# Patient Record
Sex: FEMALE | Race: BLACK OR AFRICAN AMERICAN | Employment: FULL TIME | URBAN - METROPOLITAN AREA
[De-identification: names, ages, dates, MRNs, and addresses within clinical notes are randomized per-mention and may not be internally consistent; named-entity substitution may affect disease eponyms.]

---

## 2018-08-02 ENCOUNTER — HOSPITAL ENCOUNTER (EMERGENCY)
Age: 48
Discharge: HOME | End: 2018-08-02
Attending: EMERGENCY MEDICINE
Payer: COMMERCIAL

## 2018-08-02 VITALS
BODY MASS INDEX: 41.23 KG/M2 | HEART RATE: 84 BPM | OXYGEN SATURATION: 99 % | SYSTOLIC BLOOD PRESSURE: 134 MMHG | DIASTOLIC BLOOD PRESSURE: 85 MMHG | RESPIRATION RATE: 17 BRPM | HEIGHT: 60 IN | TEMPERATURE: 98.6 F | WEIGHT: 210 LBS

## 2018-08-02 DIAGNOSIS — T78.40XA ALLERGIC REACTION, INITIAL ENCOUNTER: Primary | ICD-10-CM

## 2018-08-02 DIAGNOSIS — H10.33 ACUTE CONJUNCTIVITIS OF BOTH EYES, UNSPECIFIED ACUTE CONJUNCTIVITIS TYPE: ICD-10-CM

## 2018-08-02 DIAGNOSIS — R22.0 SWELLING OF BOTH LIPS: ICD-10-CM

## 2018-08-02 PROCEDURE — 99283 EMERGENCY DEPT VISIT LOW MDM: CPT

## 2018-08-02 PROCEDURE — 74011636637 HC RX REV CODE- 636/637: Performed by: PHYSICIAN ASSISTANT

## 2018-08-02 RX ORDER — TRIAMCINOLONE ACETONIDE 0.25 MG/G
OINTMENT TOPICAL 2 TIMES DAILY
Qty: 30 G | Refills: 0 | Status: SHIPPED | OUTPATIENT
Start: 2018-08-02 | End: 2019-09-09

## 2018-08-02 RX ORDER — PREDNISONE 50 MG/1
50 TABLET ORAL DAILY
Qty: 3 TAB | Refills: 0 | Status: SHIPPED | OUTPATIENT
Start: 2018-08-02 | End: 2018-08-05

## 2018-08-02 RX ORDER — PREDNISONE 20 MG/1
60 TABLET ORAL
Status: COMPLETED | OUTPATIENT
Start: 2018-08-02 | End: 2018-08-02

## 2018-08-02 RX ORDER — ERYTHROMYCIN 5 MG/G
OINTMENT OPHTHALMIC
Qty: 1 TUBE | Refills: 0 | Status: SHIPPED | OUTPATIENT
Start: 2018-08-02 | End: 2018-08-09

## 2018-08-02 RX ADMIN — PREDNISONE 60 MG: 20 TABLET ORAL at 10:28

## 2018-08-02 NOTE — DISCHARGE INSTRUCTIONS
Allergic Reaction: Care Instructions  Your Care Instructions    An allergic reaction is an excessive response from your immune system to a medicine, chemical, food, insect bite, or other substance. A reaction can range from mild to life-threatening. Some people have a mild rash, hives, and itching or stomach cramps. In severe reactions, swelling of your tongue and throat can close up your airway so that you cannot breathe. Follow-up care is a key part of your treatment and safety. Be sure to make and go to all appointments, and call your doctor if you are having problems. It's also a good idea to know your test results and keep a list of the medicines you take. How can you care for yourself at home? · If you know what caused your allergic reaction, be sure to avoid it. Your allergy may become more severe each time you have a reaction. · Take an over-the-counter antihistamine, such as cetirizine (Zyrtec) or loratadine (Claritin), to treat mild symptoms. Read and follow directions on the label. Some antihistamines can make you feel sleepy. Do not give antihistamines to a child unless you have checked with your doctor first. Mild symptoms include sneezing or an itchy or runny nose; an itchy mouth; a few hives or mild itching; and mild nausea or stomach discomfort. · Do not scratch hives or a rash. Put a cold, moist towel on them or take cool baths to relieve itching. Put ice packs on hives, swelling, or insect stings for 10 to 15 minutes at a time. Put a thin cloth between the ice pack and your skin. Do not take hot baths or showers. They will make the itching worse. · Your doctor may prescribe a shot of epinephrine to carry with you in case you have a severe reaction. Learn how to give yourself the shot and keep it with you at all times. Make sure it is not . · Go to the emergency room every time you have a severe reaction, even if you have used your shot of epinephrine and are feeling better. Symptoms can come back after a shot. · Wear medical alert jewelry that lists your allergies. You can buy this at most drugstores. · If your child has a severe allergy, make sure that his or her teachers, babysitters, coaches, and other caregivers know about the allergy. They should have an epinephrine shot, know how and when to give it, and have a plan to take your child to the hospital.  When should you call for help? Give an epinephrine shot if:    · You think you are having a severe allergic reaction.     · You have symptoms in more than one body area, such as mild nausea and an itchy mouth.    After giving an epinephrine shot call 911, even if you feel better.   Call 911 if:    · You have symptoms of a severe allergic reaction. These may include:  ¨ Sudden raised, red areas (hives) all over your body. ¨ Swelling of the throat, mouth, lips, or tongue. ¨ Trouble breathing. ¨ Passing out (losing consciousness). Or you may feel very lightheaded or suddenly feel weak, confused, or restless.     · You have been given an epinephrine shot, even if you feel better.    Call your doctor now or seek immediate medical care if:    · You have symptoms of an allergic reaction, such as:  ¨ A rash or hives (raised, red areas on the skin). ¨ Itching. ¨ Swelling. ¨ Belly pain, nausea, or vomiting.    Watch closely for changes in your health, and be sure to contact your doctor if:    · You do not get better as expected. Where can you learn more? Go to http://manjeet-kai.info/. Enter J613 in the       [address]: Care Instructions  Your Care Instructions    Pinkeye is redness and swelling of the eye surface and the conjunctiva (the lining of the eyelid and the covering of the white part of the eye). Pinkeye is also called conjunctivitis. Pinkeye is often caused by infection with bacteria or a virus. Dry air, allergies, smoke, and chemicals are other common causes.   Pinkeye often clears on its own in 7 to 10 days. Antibiotics only help if the pinkeye is caused by bacteria. Pinkeye caused by infection spreads easily. If an allergy or chemical is causing pinkeye, it will not go away unless you can avoid whatever is causing it. Follow-up care is a key part of your treatment and safety. Be sure to make and go to all appointments, and call your doctor if you are having problems. It's also a good idea to know your test results and keep a list of the medicines you take. How can you care for yourself at home? · Wash your hands often. Always wash them before and after you treat pinkeye or touch your eyes or face. · Use moist cotton or a clean, wet cloth to remove crust. Wipe from the inside corner of the eye to the outside. Use a clean part of the cloth for each wipe. · Put cold or warm wet cloths on your eye a few times a day if the eye hurts. · Do not wear contact lenses or eye makeup until the pinkeye is gone. Throw away any eye makeup you were using when you got pinkeye. Clean your contacts and storage case. If you wear disposable contacts, use a new pair when your eye has cleared and it is safe to wear contacts again. · If the doctor gave you antibiotic ointment or eyedrops, use them as directed. Use the medicine for as long as instructed, even if your eye starts looking better soon. Keep the bottle tip clean, and do not let it touch the eye area. · To put in eyedrops or ointment:  ¨ Tilt your head back, and pull your lower eyelid down with one finger. ¨ Drop or squirt the medicine inside the lower lid. ¨ Close your eye for 30 to 60 seconds to let the drops or ointment move around. ¨ Do not touch the ointment or dropper tip to your eyelashes or any other surface. · Do not share towels, pillows, or washcloths while you have pinkeye. When should you call for help?   Call your doctor now or seek immediate medical care if:    · You have pain in your eye, not just irritation on the surface.     · You have a change in vision or loss of vision.     · You have an increase in discharge from the eye.     · Your eye has not started to improve or begins to get worse within 48 hours after you start using antibiotics.     · Pinkeye lasts longer than 7 days.    Watch closely for changes in your health, and be sure to contact your doctor if you have any problems. Where can you learn more? Go to http://manjeet-kai.info/. Enter Y392 in the search box to learn more about \"Pinkeye: Care Instructions. \"  Current as of: November 20, 2017  Content Version: 11.7  © 0590-7620 [employer]. Care instructions adapted under license by [employer] (which disclaims liability or warranty for this information). If you have questions about a medical condition or this instruction, always ask your healthcare professional. [address] any warranty or liability for your use of this information. search box to learn more about \"Allergic Reaction: Care Instructions. \"  Current as of: October 6, 2017  Content Version: 11.7  © 8846-7083 [employer]. Care instructions adapted under license by [employer] (which disclaims liability or warranty for this information). If you have questions about a medical condition or this instruction, always ask your healthcare professional. [address] any warranty or liability for your use of this information.

## 2018-08-02 NOTE — ED TRIAGE NOTES
4 days of bilateral eye itching w/ redness and yellow green discharge. Lips dry. Visual acuity 20/20

## 2018-08-02 NOTE — ED PROVIDER NOTES
EMERGENCY DEPARTMENT HISTORY AND PHYSICAL EXAM 
 
Date: 8/2/2018 Patient Name: Stephane Man History of Presenting Illness Chief Complaint Patient presents with  
[address] [place] [address] History Provided By: Patient Chief Complaint: eye discharge/itch, swollen lips Duration: 4 Days Timing:  Constant Location: bilateral eyes Quality: itching Severity: 4 out of 10 Modifying Factors: possible shellfish allergy Associated Symptoms: denies any other associated signs or symptoms Additional History (Context): April Michael Small is a 46 y.o. female with hx of eczema who presents with complaint of swollen lips and itchy red eyes. PT states sx started 4 days ago after eating some crab legs. SHe states she had similar sx last year while in [facility] where she had to receive epinephrine. Eyes have yellow/green discharge each morning. She denies throat closing or tongue to big for mouth feeling. PCP: Yohana Gray DO Past History Past Medical History: 
Past Medical History:  
Diagnosis Date  Contact dermatitis and other eczema, due to unspecified cause Eczema Past Surgical History: 
Past Surgical History:  
Procedure Laterality Date [address] Family History: 
Family History Problem Relation Age of Onset  Hypertension Mother  Diabetes Mother Social History: 
Social History Substance Use Topics  Smoking status: Never Smoker  Smokeless tobacco: Never Used  Alcohol use Yes Comment: occassionally Allergies: 
No Known Allergies Review of Systems Review of Systems Constitutional: Negative for fatigue and fever. HENT: Positive for facial swelling. Negative for congestion. Eyes: Positive for discharge. Negative for pain. Respiratory: Negative for cough. Cardiovascular: Negative for chest pain. Gastrointestinal: Negative for abdominal pain.   
Genitourinary: Negative for dyspareunia and dysuria. Musculoskeletal: Negative for back pain. Skin: Negative for wound. Neurological: Negative for dizziness and headaches. All other systems reviewed and are negative. All Other Systems Negative Physical Exam  
 
Vitals:  
 08/02/18 8130 BP: 134/85 Pulse: 84 Resp: 17 Temp: 98.6 °F (37 °C) SpO2: 99% Weight: 95.3 kg (210 lb) Height: 5' (1.524 m) Physical Exam  
Constitutional: She appears well-developed and well-nourished. No distress. HENT:  
Head: Normocephalic and atraumatic. Right Ear: Hearing and external ear normal.  
Left Ear: Hearing and external ear normal.  
Nose: Nose normal.  
Eyes: EOM are normal. Pupils are equal, round, and reactive to light. Right eye exhibits discharge. Left eye exhibits discharge. Right conjunctiva is injected. Left conjunctiva is injected. Neck: Normal range of motion. Cardiovascular: Normal rate. Pulmonary/Chest: No respiratory distress. Diagnostic Study Results Labs - No results found for this or any previous visit (from the past 12 hour(s)). Radiologic Studies - No orders to display CT Results  (Last 48 hours) None CXR Results  (Last 48 hours) None Medical Decision Making I am the first provider for this patient. I reviewed the vital signs, available nursing notes, past medical history, past surgical history, family history and social history. Vital Signs-Reviewed the patient's vital signs. Pulse Oximetry Analysis - 99% on RA Records Reviewed: Old Medical Records Procedures: 
Procedures Provider Notes (Medical Decision Making):  
 
possible allergic reaction to shell fish, will treat with steroids and OTC benadryl, but will also treat for possible bacterial conjunctivitis MED RECONCILIATION: 
No current facility-administered medications for this encounter. Current Outpatient Prescriptions Medication Sig  
 triamcinolone acetonide (KENALOG) 0.025 % ointment Apply  to affected area two (2) times a day. use thin layer  erythromycin (ILOTYCIN) ophthalmic ointment 1 cm ribbon each eye 4x daily for 5 days  predniSONE (DELTASONE) 50 mg tablet Take 1 Tab by mouth daily for 3 days. Disposition: 
discharge DISCHARGE NOTE:  
 
Pt has been reexamined. Patient has no new complaints, changes, or physical findings. Care plan outlined and precautions discussed. Results of visit were reviewed with the patient. All medications were reviewed with the patient; will d/c home with steroids and antibiotic drops. All of pt's questions and concerns were addressed. Patient was instructed and agrees to follow up with PCP, as well as to return to the ED upon further deterioration. Patient is ready to go home. Follow-up Information Follow up With Details Comments Contact Info Og Boss, DO  As needed, follow up [id number] [phone] [phone] [facility] EMERGENCY DEPT  If symptoms worsen [address] [facility] 
[phone] Current Discharge Medication List  
  
START taking these medications Details  
triamcinolone acetonide (KENALOG) 0.025 % ointment Apply  to affected area two (2) times a day. use thin layer Qty: 30 g, Refills: 0  
  
erythromycin (ILOTYCIN) ophthalmic ointment 1 cm ribbon each eye 4x daily for 5 days Qty: 1 Tube, Refills: 0  
  
predniSONE (DELTASONE) 50 mg tablet Take 1 Tab by mouth daily for 3 days. Qty: 3 Tab, Refills: 0 Diagnosis Clinical Impression: 1. Allergic reaction, initial encounter 2. Acute conjunctivitis of both eyes, unspecified acute conjunctivitis type 3. Swelling of both lips

## 2019-07-22 ENCOUNTER — HOSPITAL ENCOUNTER (EMERGENCY)
Age: 49
Discharge: HOME | End: 2019-07-22
Attending: EMERGENCY MEDICINE
Payer: COMMERCIAL

## 2019-07-22 VITALS
RESPIRATION RATE: 16 BRPM | WEIGHT: 200 LBS | SYSTOLIC BLOOD PRESSURE: 153 MMHG | HEIGHT: 60 IN | TEMPERATURE: 98 F | DIASTOLIC BLOOD PRESSURE: 94 MMHG | BODY MASS INDEX: 39.27 KG/M2 | HEART RATE: 85 BPM | OXYGEN SATURATION: 100 %

## 2019-07-22 DIAGNOSIS — D72.819 LEUKOPENIA, UNSPECIFIED TYPE: ICD-10-CM

## 2019-07-22 DIAGNOSIS — N93.8 DUB (DYSFUNCTIONAL UTERINE BLEEDING): Primary | ICD-10-CM

## 2019-07-22 LAB
ANION GAP SERPL CALC-SCNC: 4 MMOL/L (ref 3–18)
APPEARANCE UR: CLEAR
BACTERIA URNS QL MICRO: ABNORMAL /HPF
BASOPHILS # BLD: 0 K/UL (ref 0–0.1)
BASOPHILS NFR BLD: 0 % (ref 0–2)
BILIRUB UR QL: NEGATIVE
BUN SERPL-MCNC: 8 MG/DL (ref 7–18)
BUN/CREAT SERPL: 9 (ref 12–20)
CALCIUM SERPL-MCNC: 8.4 MG/DL (ref 8.5–10.1)
CHLORIDE SERPL-SCNC: 109 MMOL/L (ref 100–111)
CO2 SERPL-SCNC: 26 MMOL/L (ref 21–32)
COLOR UR: YELLOW
CREAT SERPL-MCNC: 0.89 MG/DL (ref 0.6–1.3)
DIFFERENTIAL METHOD BLD: ABNORMAL
EOSINOPHIL # BLD: 0.1 K/UL (ref 0–0.4)
EOSINOPHIL NFR BLD: 3 % (ref 0–5)
EPITH CASTS URNS QL MICRO: ABNORMAL /LPF (ref 0–5)
ERYTHROCYTE [DISTWIDTH] IN BLOOD BY AUTOMATED COUNT: 12.9 % (ref 11.6–14.5)
GLUCOSE SERPL-MCNC: 82 MG/DL (ref 74–99)
GLUCOSE UR STRIP.AUTO-MCNC: NEGATIVE MG/DL
HCT VFR BLD AUTO: 30.1 % (ref 35–45)
HGB BLD-MCNC: 9.9 G/DL (ref 12–16)
HGB UR QL STRIP: ABNORMAL
KETONES UR QL STRIP.AUTO: NEGATIVE MG/DL
LEUKOCYTE ESTERASE UR QL STRIP.AUTO: ABNORMAL
LYMPHOCYTES # BLD: 0.8 K/UL (ref 0.9–3.6)
LYMPHOCYTES NFR BLD: 32 % (ref 21–52)
MCH RBC QN AUTO: 27.7 PG (ref 24–34)
MCHC RBC AUTO-ENTMCNC: 32.9 G/DL (ref 31–37)
MCV RBC AUTO: 84.3 FL (ref 74–97)
MONOCYTES # BLD: 0.4 K/UL (ref 0.05–1.2)
MONOCYTES NFR BLD: 15 % (ref 3–10)
MUCOUS THREADS URNS QL MICRO: ABNORMAL /LPF
NEUTS SEG # BLD: 1.2 K/UL (ref 1.8–8)
NEUTS SEG NFR BLD: 50 % (ref 40–73)
NITRITE UR QL STRIP.AUTO: NEGATIVE
PH UR STRIP: 6.5 [PH] (ref 5–8)
PLATELET # BLD AUTO: 330 K/UL (ref 135–420)
PMV BLD AUTO: 9.7 FL (ref 9.2–11.8)
POTASSIUM SERPL-SCNC: 3.4 MMOL/L (ref 3.5–5.5)
PROT UR STRIP-MCNC: NEGATIVE MG/DL
RBC # BLD AUTO: 3.57 M/UL (ref 4.2–5.3)
RBC #/AREA URNS HPF: ABNORMAL /HPF (ref 0–5)
SODIUM SERPL-SCNC: 139 MMOL/L (ref 136–145)
SP GR UR REFRACTOMETRY: 1.01 (ref 1–1.03)
UROBILINOGEN UR QL STRIP.AUTO: 0.2 EU/DL (ref 0.2–1)
WBC # BLD AUTO: 2.4 K/UL (ref 4.6–13.2)
WBC URNS QL MICRO: ABNORMAL /HPF (ref 0–4)

## 2019-07-22 PROCEDURE — 81001 URINALYSIS AUTO W/SCOPE: CPT

## 2019-07-22 PROCEDURE — 99284 EMERGENCY DEPT VISIT MOD MDM: CPT

## 2019-07-22 PROCEDURE — 80048 BASIC METABOLIC PNL TOTAL CA: CPT

## 2019-07-22 PROCEDURE — 85025 COMPLETE CBC W/AUTO DIFF WBC: CPT

## 2019-07-22 RX ORDER — MEDROXYPROGESTERONE ACETATE 10 MG/1
10 TABLET ORAL DAILY
Qty: 10 TAB | Refills: 0 | Status: SHIPPED | OUTPATIENT
Start: 2019-07-22 | End: 2019-08-01

## 2019-07-22 NOTE — LETTER
[facility] EMERGENCY DEPT 
[address] 
[address] [facility] [address] [phone] Work/School Note Date: 7/22/2019 To Whom It May concern: 
 
Nadia Martinez was seen and treated today in the emergency room by the following provider(s): 
Attending Provider: Abilio Hernandez MD 
Nurse Practitioner: Sandy Kaufman NP. Please excuse from work July 22 Sincerely, Alejandra Quinones NP

## 2019-07-22 NOTE — DISCHARGE INSTRUCTIONS
While taking the 10 days of Provera, please STOP the [address] consider calling the gyn office to see which they prefer you to take. Either way, do not take both together. Your white blood cell count was low today at 2.4 (normal is 4.6-13.2). This may cause you to have problems fighting infection. The cause of the low white count is not known at this time. I would recommend you contact the gyn that you saw that ordered the ultrasound to inquire about ultrasound results and repeat blood work.   They can order any additional testing that may need to be done

## 2019-07-22 NOTE — ED PROVIDER NOTES
11:17 AM Joanne Cummins is a 44 y.o. female who presents to ED with complaints of heavy vaginal bleeding onset approx 40 days ago. She has been seen by 2 gyn and is currently awaiting results of an ultrasound. She is on \"birth control\" to help control the bleeding w/o improvement per patient. 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 PCP: Bhupendra Smith DO      The history is provided by the patient. Vaginal Bleeding   The problem occurs constantly. The problem has been gradually worsening. Associated symptoms include abdominal pain and headaches. Pertinent negatives include no chest pain and no shortness of breath. Associated symptoms comments: + lightheaded. Nothing relieves the symptoms.         Past Medical History:   Diagnosis Date    Contact dermatitis and other eczema, due to unspecified cause     Eczema       Past Surgical History:   Procedure Laterality Date    HX BREAST REDUCTION  2000    HX TUBAL LIGATION  1992         Family History:   Problem Relation Age of Onset    Hypertension Mother     Diabetes Mother        Social History     Socioeconomic History    Marital status: SINGLE     Spouse name: Not on file    Number of children: Not on file    Years of education: Not on file    Highest education level: Not on file   Occupational History    Not on file   Social Needs    Financial resource strain: Not on file    Food insecurity:     Worry: Not on file     Inability: Not on file    Transportation needs:     Medical: Not on file     Non-medical: Not on file   Tobacco Use    Smoking status: Never Smoker    Smokeless tobacco: Never Used   Substance and Sexual Activity    Alcohol use: Yes     Comment: occassionally    Drug use: No    Sexual activity: Yes     Birth control/protection: Surgical   Lifestyle    Physical activity:     Days per week: Not on file     Minutes per session: Not on file    Stress: Not on file   Relationships    Social connections:     Talks on phone: Not on file     Gets together: Not on file     Attends Worship service: Not on file     Active member of club or organization: Not on file     Attends meetings of clubs or organizations: Not on file     Relationship status: Not on file    Intimate partner violence:     Fear of current or ex partner: Not on file     Emotionally abused: Not on file     Physically abused: Not on file     Forced sexual activity: Not on file   Other Topics Concern    Not on file   Social History Narrative    Not on file         ALLERGIES: Patient has no known allergies. Review of Systems   Constitutional: Positive for fatigue. Negative for chills and fever. HENT: Negative. Eyes: Negative. Respiratory: Negative for cough and shortness of breath. Cardiovascular: Negative for chest pain. Gastrointestinal: Positive for abdominal pain. Negative for diarrhea, nausea and vomiting. Genitourinary: Positive for vaginal bleeding. Musculoskeletal: Negative for arthralgias and myalgias. Skin: Negative for rash. Allergic/Immunologic: Negative for immunocompromised state. Neurological: Positive for light-headedness and headaches. Hematological: Does not bruise/bleed easily. Psychiatric/Behavioral: Negative. All other systems reviewed and are negative. Vitals:    07/22/19 1021   BP: (!) 153/94   Pulse: 85   Resp: 16   Temp: 98 °F (36.7 °C)   SpO2: 100%   Weight: 90.7 kg (200 lb)   Height: 5' (1.524 m)            Physical Exam   Constitutional: She is oriented to person, place, and time. She appears well-developed and well-nourished. No distress. HENT:   Head: Normocephalic and atraumatic. Eyes: Conjunctivae and EOM are normal.   Neck: Normal range of motion. Neck supple. Cardiovascular: Normal rate, regular rhythm, normal heart sounds and intact distal pulses.    Pulmonary/Chest: Effort normal and breath sounds normal. No respiratory distress. Abdominal: Soft. Bowel sounds are normal. She exhibits no distension. There is tenderness (+ mild lower abd ttp w/o rebound, guarding). Musculoskeletal: Normal range of motion. She exhibits no edema. Neurological: She is alert and oriented to person, place, and time. Skin: Skin is warm and dry. No rash noted. She is not diaphoretic. Psychiatric: She has a normal mood and affect. Her behavior is normal. Judgment and thought content normal.   Nursing note and vitals reviewed. MDM  Number of Diagnoses or Management Options  DUB (dysfunctional uterine bleeding): new and requires workup  Leukopenia, unspecified type: new and requires workup  Diagnosis management comments: DDx: R/O pregnancy, infection, DUB, cyst, enodometriosis    Patient with dysfunctional uterine bleeding. Not pregnant. No acute infections evident on exam today. Recommend return to her GYN where ultrasound was performed for further evaluation and possible correlation with low WBC. Appropriate for outpatient symptomatic treatment w/ ED return for any acute changes in condition. Patient Progress  Patient progress: stable         Pelvic Exam  Date/Time: 7/22/2019 12:00 PM  Performed by: NP  Procedure duration:  2 minutes. Documented by:  AMS, NP. Exam assisted by:  Iliana Oakley CNA. Type of exam performed: bimanual and speculum. Vaginal exam:  bleeding. Bleeding: moderate  Cervical exam:  inadequately visualized. Specimen(s) collected:  none. Bimanual exam: suspect fibroid.     Patient tolerance: Patient tolerated the procedure well with no immediate complications                Vitals:  Patient Vitals for the past 12 hrs:   Temp Pulse Resp BP SpO2   07/22/19 1021 98 °F (36.7 °C) 85 16 (!) 153/94 100 %       Medications ordered:   Medications - No data to display      Lab findings:  Recent Results (from the past 12 hour(s))   CBC WITH AUTOMATED DIFF    Collection Time: 07/22/19 11:35 AM   Result Value Ref Range WBC 2.4 (L) 4.6 - 13.2 K/uL    RBC 3.57 (L) 4.20 - 5.30 M/uL    HGB 9.9 (L) 12.0 - 16.0 g/dL    HCT 30.1 (L) 35.0 - 45.0 %    MCV 84.3 74.0 - 97.0 FL    MCH 27.7 24.0 - 34.0 PG    MCHC 32.9 31.0 - 37.0 g/dL    RDW 12.9 11.6 - 14.5 %    PLATELET 135 724 - 964 K/uL    MPV 9.7 9.2 - 11.8 FL    NEUTROPHILS 50 40 - 73 %    LYMPHOCYTES 32 21 - 52 %    MONOCYTES 15 (H) 3 - 10 %    EOSINOPHILS 3 0 - 5 %    BASOPHILS 0 0 - 2 %    ABS. NEUTROPHILS 1.2 (L) 1.8 - 8.0 K/UL    ABS. LYMPHOCYTES 0.8 (L) 0.9 - 3.6 K/UL    ABS. MONOCYTES 0.4 0.05 - 1.2 K/UL    ABS. EOSINOPHILS 0.1 0.0 - 0.4 K/UL    ABS.  BASOPHILS 0.0 0.0 - 0.1 K/UL    DF AUTOMATED     METABOLIC PANEL, BASIC    Collection Time: 07/22/19 11:35 AM   Result Value Ref Range    Sodium 139 136 - 145 mmol/L    Potassium 3.4 (L) 3.5 - 5.5 mmol/L    Chloride 109 100 - 111 mmol/L    CO2 26 21 - 32 mmol/L    Anion gap 4 3.0 - 18 mmol/L    Glucose 82 74 - 99 mg/dL    BUN 8 7.0 - 18 MG/DL    Creatinine 0.89 0.6 - 1.3 MG/DL    BUN/Creatinine ratio 9 (L) 12 - 20      GFR est AA >60 >60 ml/min/1.73m2    GFR est non-AA >60 >60 ml/min/1.73m2    Calcium 8.4 (L) 8.5 - 10.1 MG/DL   URINALYSIS W/ RFLX MICROSCOPIC    Collection Time: 07/22/19 11:35 AM   Result Value Ref Range    Color YELLOW      Appearance CLEAR      Specific gravity 1.008 1.005 - 1.030      pH (UA) 6.5 5.0 - 8.0      Protein NEGATIVE  NEG mg/dL    Glucose NEGATIVE  NEG mg/dL    Ketone NEGATIVE  NEG mg/dL    Bilirubin NEGATIVE  NEG      Blood MODERATE (A) NEG      Urobilinogen 0.2 0.2 - 1.0 EU/dL    Nitrites NEGATIVE  NEG      Leukocyte Esterase TRACE (A) NEG     URINE MICROSCOPIC ONLY    Collection Time: 07/22/19 11:35 AM   Result Value Ref Range    WBC 0 to 3 0 - 4 /hpf    RBC 21 to 35 0 - 5 /hpf    Epithelial cells 2+ 0 - 5 /lpf    Bacteria 1+ (A) NEG /hpf    Mucus 1+ (A) NEG /lpf         X-Ray, CT or other radiology findings or impressions:  No orders to display       Disposition:    Diagnosis:   1. DUB (dysfunctional uterine bleeding)    2. Leukopenia, unspecified type        Disposition: to Home    Follow-up Information     Follow up With Specialties Details Why Contact Helene Vela DO Family Practice, Family Practice Schedule an appointment as soon as possible for a visit in 1 day  [address] [address] [id number] [phone]      [facility] EMERGENCY DEPT Emergency Medicine  As needed, If symptoms worsen [address] [place]  [phone]           Discharge Medication List as of 7/22/2019 12:40 PM      START taking these medications    Details   medroxyPROGESTERone (PROVERA) 10 mg tablet Take 1 Tab by mouth daily for 10 days. , Print, Disp-10 Tab, R-0         CONTINUE these medications which have NOT CHANGED    Details   triamcinolone acetonide (KENALOG) 0.025 % ointment Apply  to affected area two (2) times a day. use thin layer, Print, Disp-30 g, R-0             Return to the ER if you are unable to obtain referral as directed. Nadia Kelly's  results have been reviewed with her. She has been counseled regarding her diagnosis, treatment, and plan. She verbally conveys understanding and agreement of the signs, symptoms, diagnosis, treatment and prognosis and additionally agrees to follow up as discussed. She also agrees with the care-plan and conveys that all of her questions have been answered. I have also provided discharge instructions for her that include: educational information regarding their diagnosis and treatment, and list of reasons why they would want to return to the ED prior to their follow-up appointment, should her condition change. ALFONZO Mccloud voice recognition was used to generate this report, which may have resulted in some phonetic based errors in grammar and contents.  Even though attempts were made to correct all the mistakes, some may have been missed, and remained in the body of the document

## 2019-09-09 ENCOUNTER — HOSPITAL ENCOUNTER (EMERGENCY)
Age: 49
Discharge: HOME | End: 2019-09-09
Attending: EMERGENCY MEDICINE
Payer: COMMERCIAL

## 2019-09-09 VITALS
TEMPERATURE: 98.4 F | WEIGHT: 200 LBS | DIASTOLIC BLOOD PRESSURE: 82 MMHG | OXYGEN SATURATION: 100 % | HEIGHT: 60 IN | RESPIRATION RATE: 18 BRPM | SYSTOLIC BLOOD PRESSURE: 124 MMHG | BODY MASS INDEX: 39.27 KG/M2 | HEART RATE: 87 BPM

## 2019-09-09 DIAGNOSIS — N39.0 URINARY TRACT INFECTION WITHOUT HEMATURIA, SITE UNSPECIFIED: Primary | ICD-10-CM

## 2019-09-09 DIAGNOSIS — N76.0 ACUTE VAGINITIS: ICD-10-CM

## 2019-09-09 LAB
APPEARANCE UR: ABNORMAL
BACTERIA URNS QL MICRO: ABNORMAL /HPF
BILIRUB UR QL: NEGATIVE
COLOR UR: YELLOW
EPITH CASTS URNS QL MICRO: ABNORMAL /LPF (ref 0–5)
GLUCOSE UR STRIP.AUTO-MCNC: NEGATIVE MG/DL
HGB UR QL STRIP: ABNORMAL
KETONES UR QL STRIP.AUTO: NEGATIVE MG/DL
LEUKOCYTE ESTERASE UR QL STRIP.AUTO: ABNORMAL
NITRITE UR QL STRIP.AUTO: NEGATIVE
PH UR STRIP: 5 [PH] (ref 5–8)
PROT UR STRIP-MCNC: ABNORMAL MG/DL
RBC #/AREA URNS HPF: ABNORMAL /HPF (ref 0–5)
SERVICE CMNT-IMP: NORMAL
SP GR UR REFRACTOMETRY: 1.02 (ref 1–1.03)
UROBILINOGEN UR QL STRIP.AUTO: 1 EU/DL (ref 0.2–1)
WBC URNS QL MICRO: ABNORMAL /HPF (ref 0–4)
WET PREP GENITAL: NORMAL

## 2019-09-09 PROCEDURE — 74011250637 HC RX REV CODE- 250/637: Performed by: PHYSICIAN ASSISTANT

## 2019-09-09 PROCEDURE — 87210 SMEAR WET MOUNT SALINE/INK: CPT

## 2019-09-09 PROCEDURE — 87491 CHLMYD TRACH DNA AMP PROBE: CPT

## 2019-09-09 PROCEDURE — 96372 THER/PROPH/DIAG INJ SC/IM: CPT

## 2019-09-09 PROCEDURE — 81001 URINALYSIS AUTO W/SCOPE: CPT

## 2019-09-09 PROCEDURE — 74011250636 HC RX REV CODE- 250/636: Performed by: PHYSICIAN ASSISTANT

## 2019-09-09 PROCEDURE — 99283 EMERGENCY DEPT VISIT LOW MDM: CPT

## 2019-09-09 RX ORDER — MORPHINE SULFATE 2 MG/ML
4 INJECTION, SOLUTION INTRAMUSCULAR; INTRAVENOUS
Status: DISCONTINUED | OUTPATIENT
Start: 2019-09-09 | End: 2019-09-09

## 2019-09-09 RX ORDER — CEPHALEXIN 500 MG/1
500 CAPSULE ORAL 4 TIMES DAILY
Qty: 28 CAP | Refills: 0 | Status: SHIPPED | OUTPATIENT
Start: 2019-09-09 | End: 2019-09-16

## 2019-09-09 RX ORDER — FLUCONAZOLE 150 MG/1
150 TABLET ORAL
Status: COMPLETED | OUTPATIENT
Start: 2019-09-09 | End: 2019-09-09

## 2019-09-09 RX ORDER — AZITHROMYCIN 250 MG/1
1000 TABLET, FILM COATED ORAL
Status: COMPLETED | OUTPATIENT
Start: 2019-09-09 | End: 2019-09-09

## 2019-09-09 RX ADMIN — LIDOCAINE HYDROCHLORIDE 250 MG: 10 INJECTION, SOLUTION EPIDURAL; INFILTRATION; INTRACAUDAL; PERINEURAL at 11:04

## 2019-09-09 RX ADMIN — AZITHROMYCIN 1000 MG: 250 TABLET, FILM COATED ORAL at 11:04

## 2019-09-09 RX ADMIN — FLUCONAZOLE 150 MG: 150 TABLET ORAL at 11:04

## 2019-09-09 NOTE — DISCHARGE INSTRUCTIONS

## 2019-09-09 NOTE — ED NOTES
Explained met qualifications for self swabbing in lieu of exam, but patient states, \"oh I need one of those\" referring to exam. Will have her provide urine sample and have MD make decision of plan.

## 2019-09-09 NOTE — ED PROVIDER NOTES
EMERGENCY DEPARTMENT HISTORY AND PHYSICAL EXAM    Date: 9/9/2019  Patient Name: Manuela Alvarado    History of Presenting Illness     Chief Complaint   Patient presents with    Vaginal Itching         History Provided By: patient        Additional History (Context): Nadia Sousa is a 52-year-old female complaining of vaginal itching. Patient states she has had vaginal itching for few days, associated with some vaginal discharge and slight odor. Patient is sexually active, concerned about STDs. Had an appointment with gynecologist about 3 weeks ago where she had cervical biopsy done. No other complaints, no dysuria, hematuria, vaginal bleeding, pelvic pain, abdominal pain, back pain, fever or chills. PCP: Jp Sandoval,         Past History     Past Medical History:  Past Medical History:   Diagnosis Date    Contact dermatitis and other eczema, due to unspecified cause     Eczema       Past Surgical History:  Past Surgical History:   Procedure Laterality Date    HX BREAST REDUCTION  2000    HX TUBAL LIGATION  1992       Family History:  Family History   Problem Relation Age of Onset    Hypertension Mother     Diabetes Mother        Social History:  Social History     Tobacco Use    Smoking status: Never Smoker    Smokeless tobacco: Never Used   Substance Use Topics    Alcohol use: Yes     Comment: occassionally    Drug use: No       Allergies:  No Known Allergies      Review of Systems     Review of Systems   Constitutional: Negative for chills and fever. HENT: positive for nasal congestion, sore throat, rhinorrhea  Eyes: Negative. Respiratory: Negative cough and negative for shortness of breath. Cardiovascular: Negative for chest pain and palpitations. Gastrointestinal: Negative for abdominal pain, constipation, diarrhea, nausea and vomiting. Genitourinary: pos for vaginal discharge, negative for bleeding  Negative for difficulty urinating and flank pain.    Musculoskeletal: Negative for back pain. Negative for gait problem and neck pain. Skin: Negative. Allergic/Immunologic: Negative. Neurological: Negative for dizziness, weakness, numbness and headaches. Psychiatric/Behavioral: Negative. All other systems reviewed and are negative. All Other Systems Negative  Physical Exam     Vitals:    09/09/19 1016   BP: 124/82   Pulse: 87   Resp: 18   Temp: 98.4 °F (36.9 °C)   SpO2: 100%   Weight: 90.7 kg (200 lb)   Height: 5' (1.524 m)     Physical Exam   Constitutional: She is oriented to person, place, and time. She appears well-developed and well-nourished. No distress. HENT:   Head: Normocephalic and atraumatic. Nose: Nose normal.   Eyes: Pupils are equal, round, and reactive to light. Conjunctivae and EOM are normal.   Neck: Normal range of motion. Neck supple. Cardiovascular: Normal rate and regular rhythm. Pulmonary/Chest: Effort normal and breath sounds normal. No respiratory distress. Abdominal: Soft. Genitourinary:   Genitourinary Comments: External vagina appears excoriated. Otherwise WNL. No internal exam, pt performed self swab. Musculoskeletal: Normal range of motion. Neurological: She is alert and oriented to person, place, and time. No cranial nerve deficit. Coordination normal.   Skin: Skin is warm. No rash noted. She is not diaphoretic. Psychiatric: She has a normal mood and affect. Her behavior is normal.   Nursing note and vitals reviewed.         Diagnostic Study Results     Labs -     Recent Results (from the past 12 hour(s))   URINALYSIS W/ RFLX MICROSCOPIC    Collection Time: 09/09/19 10:45 AM   Result Value Ref Range    Color YELLOW      Appearance TURBID      Specific gravity 1.021 1.005 - 1.030      pH (UA) 5.0 5.0 - 8.0      Protein TRACE (A) NEG mg/dL    Glucose NEGATIVE  NEG mg/dL    Ketone NEGATIVE  NEG mg/dL    Bilirubin NEGATIVE  NEG      Blood SMALL (A) NEG      Urobilinogen 1.0 0.2 - 1.0 EU/dL    Nitrites NEGATIVE  NEG      Leukocyte Esterase LARGE (A) NEG         Radiologic Studies -   No orders to display     CT Results  (Last 48 hours)    None        CXR Results  (Last 48 hours)    None            Medical Decision Making   I am the first provider for this patient. I reviewed the vital signs, available nursing notes, past medical history, past surgical history, family history and social history. Vital Signs-Reviewed the patient's vital signs. Records Reviewed: Nursing notes, old medical records and any previous labs, imaging, visits, consultations pertinent to patient care    Procedures:  Procedures    Provider Notes (Medical Decision Making):   43 yo female here with vaginal discharge. VSS, exam including pelvic unremarkable. No concern for PID or pyelo. UA c/w for UTI. Not pregnant. Wet prep negative Gonorrhea and chlamydia cultures sent, treatment given. D/c home with gyn and pcp f/u. MED RECONCILIATION:  No current facility-administered medications for this encounter. No current outpatient medications on file. Disposition:  home    DISCHARGE NOTE:     Pt has been reexamined. Patient has no new complaints, changes, or physical findings. Care plan outlined and precautions discussed. Discussed proper way to take medications. Discussed treatment plan, return precautions, symptomatic relief, and expected time to improvement. All questions answered. Patient is stable for discharge and outpatient management. Patient is ready to go home. Follow-up Information    None         There are no discharge medications for this patient. Diagnosis     Clinical Impression: No diagnosis found. Dictation disclaimer:  Please note that this dictation was completed with [employer], the computer voice recognition software. Quite often unanticipated grammatical, syntax, homophones, and other interpretive errors are inadvertently transcribed by the computer software. Please disregard these errors.   Please excuse any errors that have escaped final proofreading.

## 2019-09-10 LAB
C TRACH RRNA SPEC QL NAA+PROBE: NEGATIVE
N GONORRHOEA RRNA SPEC QL NAA+PROBE: NEGATIVE
SPECIMEN SOURCE: NORMAL

## 2019-12-29 ENCOUNTER — HOSPITAL ENCOUNTER (EMERGENCY)
Age: 49
Discharge: HOME | End: 2019-12-29
Attending: EMERGENCY MEDICINE
Payer: COMMERCIAL

## 2019-12-29 VITALS
HEART RATE: 75 BPM | TEMPERATURE: 98.2 F | OXYGEN SATURATION: 99 % | SYSTOLIC BLOOD PRESSURE: 151 MMHG | WEIGHT: 200 LBS | RESPIRATION RATE: 18 BRPM | DIASTOLIC BLOOD PRESSURE: 88 MMHG | HEIGHT: 60 IN | BODY MASS INDEX: 39.27 KG/M2

## 2019-12-29 DIAGNOSIS — N76.0 BV (BACTERIAL VAGINOSIS): ICD-10-CM

## 2019-12-29 DIAGNOSIS — B37.9 YEAST INFECTION: Primary | ICD-10-CM

## 2019-12-29 DIAGNOSIS — B96.89 BV (BACTERIAL VAGINOSIS): ICD-10-CM

## 2019-12-29 LAB
APPEARANCE UR: ABNORMAL
BACTERIA URNS QL MICRO: ABNORMAL /HPF
BILIRUB UR QL: NEGATIVE
COLOR UR: YELLOW
EPITH CASTS URNS QL MICRO: ABNORMAL /LPF (ref 0–5)
GLUCOSE UR STRIP.AUTO-MCNC: NEGATIVE MG/DL
HCG UR QL: NEGATIVE
HGB UR QL STRIP: ABNORMAL
KETONES UR QL STRIP.AUTO: NEGATIVE MG/DL
LEUKOCYTE ESTERASE UR QL STRIP.AUTO: ABNORMAL
MUCOUS THREADS URNS QL MICRO: ABNORMAL /LPF
NITRITE UR QL STRIP.AUTO: NEGATIVE
PH UR STRIP: 5.5 [PH] (ref 5–8)
PROT UR STRIP-MCNC: NEGATIVE MG/DL
RBC #/AREA URNS HPF: ABNORMAL /HPF (ref 0–5)
SERVICE CMNT-IMP: NORMAL
SP GR UR REFRACTOMETRY: 1.02 (ref 1–1.03)
UROBILINOGEN UR QL STRIP.AUTO: 0.2 EU/DL (ref 0.2–1)
WBC URNS QL MICRO: ABNORMAL /HPF (ref 0–4)
WET PREP GENITAL: NORMAL
YEAST BUDDING URNS QL: POSITIVE
YEAST URNS QL MICRO: ABNORMAL

## 2019-12-29 PROCEDURE — 87186 SC STD MICRODIL/AGAR DIL: CPT

## 2019-12-29 PROCEDURE — 74011250637 HC RX REV CODE- 250/637: Performed by: PHYSICIAN ASSISTANT

## 2019-12-29 PROCEDURE — 87077 CULTURE AEROBIC IDENTIFY: CPT

## 2019-12-29 PROCEDURE — 81001 URINALYSIS AUTO W/SCOPE: CPT

## 2019-12-29 PROCEDURE — 87210 SMEAR WET MOUNT SALINE/INK: CPT

## 2019-12-29 PROCEDURE — 81025 URINE PREGNANCY TEST: CPT

## 2019-12-29 PROCEDURE — 87491 CHLMYD TRACH DNA AMP PROBE: CPT

## 2019-12-29 PROCEDURE — 99283 EMERGENCY DEPT VISIT LOW MDM: CPT

## 2019-12-29 PROCEDURE — 87086 URINE CULTURE/COLONY COUNT: CPT

## 2019-12-29 RX ORDER — FLUCONAZOLE 150 MG/1
150 TABLET ORAL
Qty: 1 TAB | Refills: 0 | Status: SHIPPED | OUTPATIENT
Start: 2019-12-29 | End: 2019-12-29

## 2019-12-29 RX ORDER — FLUCONAZOLE 150 MG/1
150 TABLET ORAL
Status: COMPLETED | OUTPATIENT
Start: 2019-12-29 | End: 2019-12-29

## 2019-12-29 RX ORDER — METRONIDAZOLE 500 MG/1
500 TABLET ORAL 2 TIMES DAILY
Qty: 14 TAB | Refills: 0 | Status: SHIPPED | OUTPATIENT
Start: 2019-12-29 | End: 2020-01-05

## 2019-12-29 RX ADMIN — FLUCONAZOLE 150 MG: 150 TABLET ORAL at 11:43

## 2019-12-29 NOTE — ED PROVIDER NOTES
[facility] EMERGENCY DEPT      Date: 12/29/2019  Patient Name: Yris [place]    History of Presenting Illness     Chief Complaint   Patient presents with    Vaginal Itching       44 y.o. female with noted past medical history including fibroids presents the ED complaining of vaginal discharge and irritation for the past week. She notes having white thick discharge, as well as constant irritation to her vagina. States that she has had a yeast infection before and this feels similar. Denies any fever, chills, urinary complaints, vaginal bleeding, or other symptoms at this time. Patient denies any other associated signs or symptoms. Patient denies any other complaints. Nursing notes regarding the HPI and triage nursing notes were reviewed. Prior medical records were reviewed. Past History     Past Medical History:  Past Medical History:   Diagnosis Date    Contact dermatitis and other eczema, due to unspecified cause     Eczema       Past Surgical History:  Past Surgical History:   Procedure Laterality Date    HX BREAST REDUCTION  2000    HX TUBAL LIGATION  1992       Family History:  Family History   Problem Relation Age of Onset    Hypertension Mother     Diabetes Mother        Social History:  Social History     Tobacco Use    Smoking status: Never Smoker    Smokeless tobacco: Never Used   Substance Use Topics    Alcohol use: Yes     Comment: occassionally    Drug use: No       Allergies:  No Known Allergies    Patient's primary care provider (as noted in EPIC): Og Boss DO    Review of Systems   Constitutional:  Denies malaise, fever, chills. GI/ABD:  Denies injury, pain, distention, nausea, vomiting, diarrhea. : + vaginal bleeding/discharge. Denies injury, pain, dysuria or urgency. Back:  + low back pain. Pelvis:  Denies injury or pain. Skin: Denies injury, rash, itching or skin changes. All other systems negative as reviewed.      Visit Vitals  /88 (BP 1 Location: Right arm)   Pulse 75   Temp 98.2 °F (36.8 °C)   Resp 18   Ht 5' (1.524 m)   Wt 90.7 kg (200 lb)   SpO2 99%   BMI 39.06 kg/m²       PHYSICAL EXAM:    CONSTITUTIONAL:  Alert, in no apparent distress;  well developed;  well nourished. HEAD:  Normocephalic, atraumatic. EYES:  EOMI. Non-icteric sclera. Normal conjunctiva. ENTM:  Mouth: mucous membranes moist.  NECK:  Supple  RESPIRATORY:  Chest clear, equal breath sounds, good air movement. Without wheezes, rhonchi or rales. CARDIOVASCULAR:  Regular rate and rhythm. No murmurs, rubs, or gallops. GI:  Normal bowel sounds, abdomen soft and non-tender. No rebound or guarding. No palpable masses. BACK:  Non-tender. NEURO:  Moves all four extremities, and grossly normal motor exam.  SKIN:  No rashes;  Normal for age. PSYCH:  Alert and normal affect. ED COURSE:  Urine or urethral specimen(s) may have been collected to check for gonorrhea and chlamydia.      Recent Results (from the past 12 hour(s))   URINALYSIS W/ RFLX MICROSCOPIC    Collection Time: 12/29/19  9:45 AM   Result Value Ref Range    Color YELLOW      Appearance CLOUDY      Specific gravity 1.018 1.005 - 1.030      pH (UA) 5.5 5.0 - 8.0      Protein NEGATIVE  NEG mg/dL    Glucose NEGATIVE  NEG mg/dL    Ketone NEGATIVE  NEG mg/dL    Bilirubin NEGATIVE  NEG      Blood TRACE (A) NEG      Urobilinogen 0.2 0.2 - 1.0 EU/dL    Nitrites NEGATIVE  NEG      Leukocyte Esterase LARGE (A) NEG     HCG URINE, QL    Collection Time: 12/29/19  9:45 AM   Result Value Ref Range    HCG urine, QL NEGATIVE  NEG     URINE MICROSCOPIC ONLY    Collection Time: 12/29/19  9:45 AM   Result Value Ref Range    WBC 36 to 50 0 - 4 /hpf    RBC 2 to 5 0 - 5 /hpf    Epithelial cells 4+ 0 - 5 /lpf    Bacteria 2+ (A) NEG /hpf    Mucus 1+ (A) NEG /lpf    Yeast FEW (A) NEG      Budding yeast POSITIVE (A) NEG     WET PREP    Collection Time: 12/29/19 10:55 AM   Result Value Ref Range    Special Requests: NO SPECIAL REQUESTS      Wet prep NO TRICHOMONAS SEEN      Wet prep CLUE CELLS PRESENT  FEW        Wet prep FEW  YEAST          Differential diagnoses/ medical decision making:   Vaginal discharge secondary to yeast infection, bacterial vaginosis, urinary tract infection, malignancy, pregnancy, ectopic pregnancy (lower on differential), other etiologies or combination of the above. Based on the patient's history of presenting illness, physical examination, laboratory, radiographic, and/or tests results, and response to medical interventions, I believe the patient most likely is having a candidiasis vaginalis and BV. Urine culture ordered as patient has 36-50 WBC's in her urine but is asymptomatic regarding this. Plan for diflucan here, flagyl at home, followed by a second diflucan once flagyl is completed. F/u with OBGYN. Diagnosis:   1. Yeast infection    2. BV (bacterial vaginosis)      Disposition: Discharge    Follow-up Information     Follow up With Specialties Details Why Contact Info    Sarah Huffman MD Obstetrics & Gynecology, Gynecology, Obstetrics In 3 days  [address] [address]  [phone]      [facility] EMERGENCY DEPT Emergency Medicine  If symptoms worsen [address] [phone]          Patient's Medications   Start Taking    FLUCONAZOLE (DIFLUCAN) 150 MG TABLET    Take 1 Tab by mouth now for 1 dose. METRONIDAZOLE (FLAGYL) 500 MG TABLET    Take 1 Tab by mouth two (2) times a day for 7 days.    Continue Taking    No medications on file   These Medications have changed    No medications on file   Stop Taking    No medications on file     XIMENA Magallanes

## 2019-12-29 NOTE — ED TRIAGE NOTES
Patient complaining vaginal itching/burning and discharge x 1 week. Denies taking any antibiotics recently. Denies any odor.

## 2019-12-29 NOTE — DISCHARGE INSTRUCTIONS
Patient Education        Bacterial Vaginosis: Care Instructions  Your Care Instructions    Bacterial vaginosis is a type of vaginal infection. It is caused by excess growth of certain bacteria that are normally found in the vagina. Symptoms can include itching, swelling, pain when you urinate or have sex, and a gray or yellow discharge with a \"fishy\" odor. It is not considered an infection that is spread through sexual contact. Although symptoms can be annoying and uncomfortable, bacterial vaginosis does not usually cause other health problems. However, if you have it while you are pregnant, it can cause complications. While the infection may go away on its own, most doctors use antibiotics to treat it. You may have been prescribed pills or vaginal cream. With treatment, bacterial vaginosis usually clears up in 5 to 7 days. Follow-up care is a key part of your treatment and safety. Be sure to make and go to all appointments, and call your doctor if you are having problems. It's also a good idea to know your test results and keep a list of the medicines you take. How can you care for yourself at home? · Take your antibiotics as directed. Do not stop taking them just because you feel better. You need to take the full course of antibiotics. · Do not eat or drink anything that contains alcohol if you are taking metronidazole (Flagyl). · Keep using your medicine if you start your period. Use pads instead of tampons while using a vaginal cream or suppository. Tampons can absorb the medicine. · Wear loose cotton clothing. Do not wear nylon and other materials that hold body heat and moisture close to the skin. · Do not scratch. Relieve itching with a cold pack or a cool bath. · Do not wash your vaginal area more than once a day. Use plain water or a mild, unscented soap. Do not douche. When should you call for help?   Watch closely for changes in your health, and be sure to contact your doctor if:    · You have unexpected vaginal bleeding.     · You have a fever.     · You have new or increased pain in your vagina or pelvis.     · You are not getting better after 1 week.     · Your symptoms return after you finish the course of your medicine. Where can you learn more? Go to http://manjeet-kai.info/. Dejon Coyle in the search box to learn more about \"Bacterial Vaginosis: Care Instructions. \"  Current as of: February 19, 2019  Content Version: 12.2  © 3769-8286 [employer]. Care instructions adapted under license by [employer] (which disclaims liability or warranty for this information). If you have questions about a medical condition or this instruction, always ask your healthcare professional. [address] any warranty or liability for your use of this information. Patient Education        Candidiasis: Care Instructions  Your Care Instructions  Candidiasis (say \"ynq-xgl-BJ-uh-christi\") is a yeast infection. Yeast normally lives in your body. But it can cause problems if your body's defenses don't work as they should. Some medicines can increase your chance of getting a yeast infection. These include antibiotics, steroids, and cancer drugs. And some diseases like AIDS and diabetes can make you more likely to get yeast infections. There are different types of yeast infections. Pricila Williams is a yeast infection in the mouth. It usually occurs in people with weak immune systems. It causes white patches inside the mouth and throat. Yeast infections of the skin usually occur in skin folds where the skin stays moist. They cause red, oozing patches on your skin. Babies can get these infections under the diaper. People who often wear gloves can get them on their hands. Many women get vaginal yeast infections. They are most common when women take antibiotics. These infections can cause the vagina to itch and burn.  They also cause white discharge that looks like cottage cheese. In rare cases, yeast infects the blood. This can cause serious disease. This kind of infection is treated with medicine given through a needle into a vein (IV). After you start treatment, a yeast infection usually goes away quickly. But if your immune system is weak, the infection may come back. Tell your doctor if you get yeast infections often. Follow-up care is a key part of your treatment and safety. Be sure to make and go to all appointments, and call your doctor if you are having problems. It's also a good idea to know your test results and keep a list of the medicines you take. How can you care for yourself at home? · Take your medicines exactly as prescribed. Call your doctor if you think you are having a problem with your medicine. · Use antibiotics only as directed by your doctor. · Eat yogurt with live cultures. It has bacteria called lactobacillus. It may help prevent some types of yeast infections. · Keep your skin clean and dry. Put powder on moist places. · If you are using a cream or suppository to treat a vaginal yeast infection, don't use condoms or a diaphragm. Use a different type of birth control. · Eat a healthy diet and get regular exercise. This will help keep your immune system strong. When should you call for help? Watch closely for changes in your health, and be sure to contact your doctor if:    · You do not get better as expected. Where can you learn more? Go to http://manjeet-kai.info/. Enter S095 in the search box to learn more about \"Candidiasis: Care Instructions. \"  Current as of: February 19, 2019  Content Version: 12.2  © 5416-5464 [employer]. Care instructions adapted under license by [employer] (which disclaims liability or warranty for this information).  If you have questions about a medical condition or this instruction, always ask your healthcare professional. Tomas Valdez any warranty or liability for your use of this information.

## 2019-12-31 LAB
BACTERIA SPEC CULT: ABNORMAL
SERVICE CMNT-IMP: ABNORMAL

## 2020-02-05 ENCOUNTER — HOSPITAL ENCOUNTER (EMERGENCY)
Age: 50
Discharge: HOME | End: 2020-02-05
Attending: EMERGENCY MEDICINE | Admitting: EMERGENCY MEDICINE
Payer: COMMERCIAL

## 2020-02-05 ENCOUNTER — APPOINTMENT (OUTPATIENT)
Dept: CT IMAGING | Age: 50
End: 2020-02-05
Attending: EMERGENCY MEDICINE
Payer: COMMERCIAL

## 2020-02-05 VITALS
HEIGHT: 60 IN | RESPIRATION RATE: 18 BRPM | SYSTOLIC BLOOD PRESSURE: 104 MMHG | HEART RATE: 88 BPM | DIASTOLIC BLOOD PRESSURE: 64 MMHG | WEIGHT: 198 LBS | TEMPERATURE: 99.5 F | BODY MASS INDEX: 38.87 KG/M2 | OXYGEN SATURATION: 99 %

## 2020-02-05 DIAGNOSIS — K56.600 PARTIAL SMALL BOWEL OBSTRUCTION (HCC): ICD-10-CM

## 2020-02-05 DIAGNOSIS — K52.9 ENTERITIS: ICD-10-CM

## 2020-02-05 DIAGNOSIS — R11.2 NON-INTRACTABLE VOMITING WITH NAUSEA, UNSPECIFIED VOMITING TYPE: ICD-10-CM

## 2020-02-05 DIAGNOSIS — R10.32 ACUTE ABDOMINAL PAIN IN LEFT LOWER QUADRANT: Primary | ICD-10-CM

## 2020-02-05 LAB
ALBUMIN SERPL-MCNC: 3.8 G/DL (ref 3.4–5)
ALBUMIN/GLOB SERPL: 1 {RATIO} (ref 0.8–1.7)
ALP SERPL-CCNC: 62 U/L (ref 45–117)
ALT SERPL-CCNC: 28 U/L (ref 13–56)
ANION GAP SERPL CALC-SCNC: 6 MMOL/L (ref 3–18)
APPEARANCE UR: ABNORMAL
AST SERPL-CCNC: 17 U/L (ref 10–38)
BACTERIA URNS QL MICRO: ABNORMAL /HPF
BASOPHILS # BLD: 0 K/UL (ref 0–0.1)
BASOPHILS NFR BLD: 0 % (ref 0–2)
BILIRUB SERPL-MCNC: 0.4 MG/DL (ref 0.2–1)
BILIRUB UR QL: NEGATIVE
BUN SERPL-MCNC: 10 MG/DL (ref 7–18)
BUN/CREAT SERPL: 11 (ref 12–20)
CALCIUM SERPL-MCNC: 9 MG/DL (ref 8.5–10.1)
CHLORIDE SERPL-SCNC: 107 MMOL/L (ref 100–111)
CO2 SERPL-SCNC: 26 MMOL/L (ref 21–32)
COLOR UR: 0
CREAT SERPL-MCNC: 0.87 MG/DL (ref 0.6–1.3)
DIFFERENTIAL METHOD BLD: ABNORMAL
EOSINOPHIL # BLD: 0.1 K/UL (ref 0–0.4)
EOSINOPHIL NFR BLD: 1 % (ref 0–5)
EPITH CASTS URNS QL MICRO: ABNORMAL /LPF (ref 0–5)
ERYTHROCYTE [DISTWIDTH] IN BLOOD BY AUTOMATED COUNT: 17.1 % (ref 11.6–14.5)
GLOBULIN SER CALC-MCNC: 3.7 G/DL (ref 2–4)
GLUCOSE SERPL-MCNC: 103 MG/DL (ref 74–99)
GLUCOSE UR STRIP.AUTO-MCNC: NEGATIVE MG/DL
HCG UR QL: NEGATIVE
HCT VFR BLD AUTO: 32.2 % (ref 35–45)
HGB BLD-MCNC: 9.9 G/DL (ref 12–16)
HGB UR QL STRIP: ABNORMAL
KETONES UR QL STRIP.AUTO: NEGATIVE MG/DL
LEUKOCYTE ESTERASE UR QL STRIP.AUTO: ABNORMAL
LIPASE SERPL-CCNC: 115 U/L (ref 73–393)
LYMPHOCYTES # BLD: 0.8 K/UL (ref 0.9–3.6)
LYMPHOCYTES NFR BLD: 15 % (ref 21–52)
MCH RBC QN AUTO: 23.7 PG (ref 24–34)
MCHC RBC AUTO-ENTMCNC: 30.7 G/DL (ref 31–37)
MCV RBC AUTO: 77 FL (ref 74–97)
MONOCYTES # BLD: 0.4 K/UL (ref 0.05–1.2)
MONOCYTES NFR BLD: 8 % (ref 3–10)
NEUTS SEG # BLD: 4 K/UL (ref 1.8–8)
NEUTS SEG NFR BLD: 76 % (ref 40–73)
NITRITE UR QL STRIP.AUTO: NEGATIVE
PH UR STRIP: 5.5 [PH] (ref 5–8)
PLATELET # BLD AUTO: 403 K/UL (ref 135–420)
PMV BLD AUTO: 9.7 FL (ref 9.2–11.8)
POTASSIUM SERPL-SCNC: 3.7 MMOL/L (ref 3.5–5.5)
PROT SERPL-MCNC: 7.5 G/DL (ref 6.4–8.2)
PROT UR STRIP-MCNC: 30 MG/DL
RBC # BLD AUTO: 4.18 M/UL (ref 4.2–5.3)
RBC #/AREA URNS HPF: ABNORMAL /HPF (ref 0–5)
SODIUM SERPL-SCNC: 139 MMOL/L (ref 136–145)
SP GR UR REFRACTOMETRY: 1.02 (ref 1–1.03)
UROBILINOGEN UR QL STRIP.AUTO: 0.2 EU/DL (ref 0.2–1)
WBC # BLD AUTO: 5.2 K/UL (ref 4.6–13.2)
WBC URNS QL MICRO: ABNORMAL /HPF (ref 0–4)

## 2020-02-05 PROCEDURE — 81001 URINALYSIS AUTO W/SCOPE: CPT

## 2020-02-05 PROCEDURE — 96374 THER/PROPH/DIAG INJ IV PUSH: CPT

## 2020-02-05 PROCEDURE — 81025 URINE PREGNANCY TEST: CPT

## 2020-02-05 PROCEDURE — 74011250636 HC RX REV CODE- 250/636: Performed by: EMERGENCY MEDICINE

## 2020-02-05 PROCEDURE — 74011636320 HC RX REV CODE- 636/320: Performed by: EMERGENCY MEDICINE

## 2020-02-05 PROCEDURE — 80053 COMPREHEN METABOLIC PANEL: CPT

## 2020-02-05 PROCEDURE — 96375 TX/PRO/DX INJ NEW DRUG ADDON: CPT

## 2020-02-05 PROCEDURE — 74177 CT ABD & PELVIS W/CONTRAST: CPT

## 2020-02-05 PROCEDURE — 74011250637 HC RX REV CODE- 250/637: Performed by: EMERGENCY MEDICINE

## 2020-02-05 PROCEDURE — 85025 COMPLETE CBC W/AUTO DIFF WBC: CPT

## 2020-02-05 PROCEDURE — 83690 ASSAY OF LIPASE: CPT

## 2020-02-05 PROCEDURE — 99284 EMERGENCY DEPT VISIT MOD MDM: CPT

## 2020-02-05 RX ORDER — FAMOTIDINE 10 MG/ML
20 INJECTION INTRAVENOUS
Status: COMPLETED | OUTPATIENT
Start: 2020-02-05 | End: 2020-02-05

## 2020-02-05 RX ORDER — ACETAMINOPHEN 500 MG
1000 TABLET ORAL
Qty: 50 TAB | Refills: 0 | Status: SHIPPED | OUTPATIENT
Start: 2020-02-05

## 2020-02-05 RX ORDER — DICYCLOMINE HYDROCHLORIDE 10 MG/1
20 CAPSULE ORAL
Qty: 20 CAP | Refills: 0 | Status: SHIPPED | OUTPATIENT
Start: 2020-02-05 | End: 2020-04-29 | Stop reason: SDUPTHER

## 2020-02-05 RX ORDER — ONDANSETRON 2 MG/ML
4 INJECTION INTRAMUSCULAR; INTRAVENOUS
Status: COMPLETED | OUTPATIENT
Start: 2020-02-05 | End: 2020-02-05

## 2020-02-05 RX ORDER — DICYCLOMINE HYDROCHLORIDE 10 MG/1
20 CAPSULE ORAL
Status: COMPLETED | OUTPATIENT
Start: 2020-02-05 | End: 2020-02-05

## 2020-02-05 RX ORDER — TRAMADOL HYDROCHLORIDE 50 MG/1
50 TABLET ORAL
Qty: 6 TAB | Refills: 0 | Status: SHIPPED | OUTPATIENT
Start: 2020-02-05 | End: 2020-02-08

## 2020-02-05 RX ORDER — CIPROFLOXACIN 500 MG/1
500 TABLET ORAL 2 TIMES DAILY
Qty: 14 TAB | Refills: 0 | Status: SHIPPED | OUTPATIENT
Start: 2020-02-05 | End: 2020-02-12

## 2020-02-05 RX ORDER — MORPHINE SULFATE 4 MG/ML
4 INJECTION, SOLUTION INTRAMUSCULAR; INTRAVENOUS
Status: COMPLETED | OUTPATIENT
Start: 2020-02-05 | End: 2020-02-05

## 2020-02-05 RX ORDER — DICYCLOMINE HYDROCHLORIDE 10 MG/1
20 CAPSULE ORAL 4 TIMES DAILY
Status: DISCONTINUED | OUTPATIENT
Start: 2020-02-06 | End: 2020-02-05

## 2020-02-05 RX ORDER — KETOROLAC TROMETHAMINE 30 MG/ML
30 INJECTION, SOLUTION INTRAMUSCULAR; INTRAVENOUS
Status: COMPLETED | OUTPATIENT
Start: 2020-02-05 | End: 2020-02-05

## 2020-02-05 RX ORDER — CIPROFLOXACIN 500 MG/1
500 TABLET ORAL
Status: COMPLETED | OUTPATIENT
Start: 2020-02-05 | End: 2020-02-05

## 2020-02-05 RX ORDER — METRONIDAZOLE 500 MG/1
500 TABLET ORAL 2 TIMES DAILY
Qty: 14 TAB | Refills: 0 | Status: SHIPPED | OUTPATIENT
Start: 2020-02-05 | End: 2020-02-12

## 2020-02-05 RX ORDER — METRONIDAZOLE 250 MG/1
500 TABLET ORAL
Status: COMPLETED | OUTPATIENT
Start: 2020-02-05 | End: 2020-02-05

## 2020-02-05 RX ORDER — ONDANSETRON 4 MG/1
4-8 TABLET, ORALLY DISINTEGRATING ORAL
Qty: 15 TAB | Refills: 0 | Status: SHIPPED | OUTPATIENT
Start: 2020-02-05 | End: 2020-12-22

## 2020-02-05 RX ORDER — DICYCLOMINE HYDROCHLORIDE 10 MG/1
10 CAPSULE ORAL 4 TIMES DAILY
Status: DISCONTINUED | OUTPATIENT
Start: 2020-02-06 | End: 2020-02-05

## 2020-02-05 RX ADMIN — MORPHINE SULFATE 4 MG: 4 INJECTION, SOLUTION INTRAMUSCULAR; INTRAVENOUS at 21:23

## 2020-02-05 RX ADMIN — SODIUM CHLORIDE 1000 ML: 900 INJECTION, SOLUTION INTRAVENOUS at 20:47

## 2020-02-05 RX ADMIN — IOPAMIDOL 93 ML: 612 INJECTION, SOLUTION INTRAVENOUS at 21:55

## 2020-02-05 RX ADMIN — ONDANSETRON 4 MG: 2 INJECTION INTRAMUSCULAR; INTRAVENOUS at 20:47

## 2020-02-05 RX ADMIN — FAMOTIDINE 20 MG: 10 INJECTION, SOLUTION INTRAVENOUS at 20:48

## 2020-02-05 RX ADMIN — CIPROFLOXACIN HYDROCHLORIDE 500 MG: 500 TABLET, FILM COATED ORAL at 23:25

## 2020-02-05 RX ADMIN — KETOROLAC TROMETHAMINE 30 MG: 30 INJECTION, SOLUTION INTRAMUSCULAR at 21:23

## 2020-02-05 RX ADMIN — DICYCLOMINE HYDROCHLORIDE 20 MG: 10 CAPSULE ORAL at 23:25

## 2020-02-05 RX ADMIN — METRONIDAZOLE 500 MG: 250 TABLET ORAL at 23:25

## 2020-02-05 NOTE — LETTER
NOTIFICATION RETURN TO WORK / SCHOOL 
 
2/5/2020 11:14 PM 
 
Ms. Moises Pollard [address] [phone] To Whom It May Concern: 
 
Moises Pollard is currently under the care of [facility] EMERGENCY DEPT. She will return to work/school on: 2/9/20 If there are questions or concerns please have the patient contact our office. Sincerely, Debbie Zamarripa MD

## 2020-02-06 NOTE — DISCHARGE INSTRUCTIONS
Patient Education        Abdominal Pain: Care Instructions  Your Care Instructions    Abdominal pain has many possible causes. Some aren't serious and get better on their own in a few days. Others need more testing and treatment. If your pain continues or gets worse, you need to be rechecked and may need more tests to find out what is wrong. You may need surgery to correct the problem. Don't ignore new symptoms, such as fever, nausea and vomiting, urination problems, pain that gets worse, and dizziness. These may be signs of a more serious problem. Your doctor may have recommended a follow-up visit in the next 8 to 12 hours. If you are not getting better, you may need more tests or treatment. The doctor has checked you carefully, but problems can develop later. If you notice any problems or new symptoms, get medical treatment right away. Follow-up care is a key part of your treatment and safety. Be sure to make and go to all appointments, and call your doctor if you are having problems. It's also a good idea to know your test results and keep a list of the medicines you take. How can you care for yourself at home? · Rest until you feel better. · To prevent dehydration, drink plenty of fluids, enough so that your urine is light yellow or clear like water. Choose water and other caffeine-free clear liquids until you feel better. If you have kidney, heart, or liver disease and have to limit fluids, talk with your doctor before you increase the amount of fluids you drink. · If your stomach is upset, eat mild foods, such as rice, dry toast or crackers, bananas, and applesauce. Try eating several small meals instead of two or three large ones. · Wait until 48 hours after all symptoms have gone away before you have spicy foods, alcohol, and drinks that contain caffeine. · Do not eat foods that are high in fat. · Avoid anti-inflammatory medicines such as aspirin, ibuprofen (Advil, Motrin), and naproxen (Aleve). These can cause stomach upset. Talk to your doctor if you take daily aspirin for another health problem. When should you call for help? Call 911 anytime you think you may need emergency care. For example, call if:    · You passed out (lost consciousness).     · You pass maroon or very bloody stools.     · You vomit blood or what looks like coffee grounds.     · You have new, severe belly pain.    Call your doctor now or seek immediate medical care if:    · Your pain gets worse, especially if it becomes focused in one area of your belly.     · You have a new or higher fever.     · Your stools are black and look like tar, or they have streaks of blood.     · You have unexpected vaginal bleeding.     · You have symptoms of a urinary tract infection. These may include:  ? Pain when you urinate. ? Urinating more often than usual.  ? Blood in your urine.     · You are dizzy or lightheaded, or you feel like you may faint.    Watch closely for changes in your health, and be sure to contact your doctor if:    · You are not getting better after 1 day (24 hours). Where can you learn more? Go to http://manjeet-kai.info/. Enter S630 in the search box to learn more about \"Abdominal Pain: Care Instructions. \"  Current as of: June 26, 2019  Content Version: 12.2  © 4660-9273 [employer]. Care instructions adapted under license by [employer] (which disclaims liability or warranty for this information). If you have questions about a medical condition or this instruction, always ask your healthcare professional. [place] [address] any warranty or liability for your use of this information. Patient Education        Bowel Blockage (Intestinal Obstruction): Care Instructions  Your Care Instructions  A bowel blockage, also called an intestinal obstruction, can prevent gas, fluids, or solids from moving through the intestines normally.  It can cause constipation and, rarely, diarrhea. You may have pain, nausea, vomiting, and cramping. Most of the time, complete blockages require a stay in the hospital and possibly surgery. But if your bowel is only partly blocked, your doctor may tell you to wait until it clears on its own and you are able to pass gas and stool. If so, there are things you can do at home to help make you feel better. If you have had surgery for a bowel blockage, there are things you can do at home to make sure you heal well. You can also make some changes to keep your bowel from becoming blocked again. Follow-up care is a key part of your treatment and safety. Be sure to make and go to all appointments, and call your doctor if you are having problems. It's also a good idea to know your test results and keep a list of the medicines you take. How can you care for yourself at home? If your doctor has told you to wait at home for a blockage to clear on its own:  · Follow your doctor's instructions. These may include eating a liquid diet to avoid complete blockage. · Be safe with medicines. Take your medicines exactly as prescribed. Call your doctor if you think you are having a problem with your medicine. · Put a heating pad set on low on your belly to relieve mild cramps and pain. To prevent another blockage  · Try to eat smaller amounts of food more often. For example, have 5 or 6 small meals throughout the day instead of 2 or 3 large meals. · Chew your food very well. Try to chew each bite about 20 times or until it is liquid. · Avoid high-fiber foods and raw fruits and vegetables with skins, husks, strings, or seeds. These can form a ball of undigested material that can cause a blockage if a part of your bowel is scarred or narrowed. · Check with your doctor before you eat whole-grain products or use a fiber supplement such as Citrucel or Metamucil.   · To help you have regular bowel movements, eat at regular times, do not strain during a bowel movement, and drink at least 8 to 10 glasses of water each day. If you have kidney, heart, or liver disease and have to limit fluids, talk with your doctor or before you increase the amount of fluids you drink. · Drink high-calorie liquid formulas if your doctor says to. Severe symptoms may make it hard for your body to take in vitamins and minerals. · Get regular exercise. It helps you digest your food better. Get at least 30 minutes of physical activity on most days of the week. Walking is a good choice. When should you call for help? Call your doctor now or seek immediate medical care if:    · You have a fever.     · You are vomiting.     · You have new or worse belly pain.     · You cannot pass stools or gas.    Watch closely for changes in your health, and be sure to contact your doctor if you have any problems. Where can you learn more? Go to http://manjeet-kai.info/. Enter A281 in the search box to learn more about \"Bowel Blockage (Intestinal Obstruction): Care Instructions. \"  Current as of: November 7, 2018  Content Version: 12.2  © 1653-2086 [employer]. Care instructions adapted under license by [employer] (which disclaims liability or warranty for this information). If you have questions about a medical condition or this instruction, always ask your healthcare professional. [address] any warranty or liability for your use of this information.

## 2020-02-06 NOTE — ED PROVIDER NOTES
Barrera Reich is a 44 y.o. female with complaints of lower abdominal pain that started yesterday progressively worse today with nausea and vomiting that started today. Patient has no diarrhea. She has no urinary symptoms. Patient recently started on iron pills along with other medication for uterine fibroids and also received a progesterone shot this past Friday. Ever since then her stomach is been bothering her. She denies any fever. She has no other significant abdominal issues. She denies any chest pain or cough. Pain is sharp and stabbing and cramping. It is worse with movement and palpation. The history is provided by the patient.         Past Medical History:   Diagnosis Date    Contact dermatitis and other eczema, due to unspecified cause     Eczema       Past Surgical History:   Procedure Laterality Date    HX BREAST REDUCTION  2000    HX TUBAL LIGATION  1992         Family History:   Problem Relation Age of Onset    Hypertension Mother     Diabetes Mother        Social History     Socioeconomic History    Marital status: SINGLE     Spouse name: Not on file    Number of children: Not on file    Years of education: Not on file    Highest education level: Not on file   Occupational History    Not on file   Social Needs    Financial resource strain: Not on file    Food insecurity:     Worry: Not on file     Inability: Not on file    Transportation needs:     Medical: Not on file     Non-medical: Not on file   Tobacco Use    Smoking status: Never Smoker    Smokeless tobacco: Never Used   Substance and Sexual Activity    Alcohol use: Yes     Comment: occassionally    Drug use: No    Sexual activity: Yes     Birth control/protection: Surgical   Lifestyle    Physical activity:     Days per week: Not on file     Minutes per session: Not on file    Stress: Not on file   Relationships    Social connections:     Talks on phone: Not on file     Gets together: Not on file     Attends Islam service: Not on file     Active member of club or organization: Not on file     Attends meetings of clubs or organizations: Not on file     Relationship status: Not on file    Intimate partner violence:     Fear of current or ex partner: Not on file     Emotionally abused: Not on file     Physically abused: Not on file     Forced sexual activity: Not on file   Other Topics Concern    Not on file   Social History Narrative    Not on file         ALLERGIES: Patient has no known allergies. Review of Systems   Constitutional: Positive for appetite change. Negative for chills and fever. HENT: Negative for sore throat. Eyes: Negative for visual disturbance. Respiratory: Negative for shortness of breath. Cardiovascular: Negative for chest pain. Gastrointestinal: Positive for abdominal pain. Negative for blood in stool. Genitourinary: Negative for difficulty urinating. Musculoskeletal: Negative for gait problem. Skin: Negative for rash. Allergic/Immunologic: Negative for immunocompromised state. Neurological: Negative for syncope. Psychiatric/Behavioral: Positive for sleep disturbance. Vitals:    02/05/20 2215 02/05/20 2230 02/05/20 2245 02/05/20 2300   BP:    104/64   Pulse:       Resp:       Temp:       SpO2: 98% 98% 98% 99%   Weight:       Height:                Physical Exam  Vitals signs and nursing note reviewed. Constitutional:       General: She is in acute distress. Appearance: She is well-developed. She is not ill-appearing, toxic-appearing or diaphoretic. HENT:      Head: Normocephalic and atraumatic. Right Ear: External ear normal.      Left Ear: External ear normal.      Nose: Nose normal.      Mouth/Throat:      Pharynx: Uvula midline. Eyes:      General: No scleral icterus. Conjunctiva/sclera: Conjunctivae normal.   Neck:      Musculoskeletal: Neck supple. Cardiovascular:      Rate and Rhythm: Normal rate and regular rhythm.       Heart sounds: Normal heart sounds. Pulmonary:      Effort: Pulmonary effort is normal.      Breath sounds: Normal breath sounds. Abdominal:      General: Abdomen is protuberant. Palpations: Abdomen is soft. There is no hepatomegaly or splenomegaly. Tenderness: There is abdominal tenderness in the right lower quadrant, suprapubic area and left lower quadrant. There is guarding. There is no rebound. Skin:     General: Skin is warm and dry. Neurological:      Mental Status: She is alert and oriented to person, place, and time.       Gait: Gait normal.   Psychiatric:         Behavior: Behavior normal.          MDM       Procedures    Vitals:  Patient Vitals for the past 12 hrs:   Temp Pulse Resp BP SpO2   02/05/20 2300    104/64 99 %   02/05/20 2245     98 %   02/05/20 2230     98 %   02/05/20 2215     98 %   02/05/20 2200     100 %   02/05/20 2130     95 %   02/05/20 2115     98 %   02/05/20 2100    170/77 100 %   02/05/20 2045    156/84 99 %   02/05/20 2042    147/80 100 %   02/05/20 1945 99.5 °F (37.5 °C) 88 18 (!) 180/102 100 %         Medications ordered:   Medications   ciprofloxacin HCl (CIPRO) tablet 500 mg (has no administration in time range)   metroNIDAZOLE (FLAGYL) tablet 500 mg (has no administration in time range)   dicyclomine (BENTYL) capsule 20 mg (has no administration in time range)   sodium chloride 0.9 % bolus infusion 1,000 mL (0 mL IntraVENous IV Completed 2/5/20 2147)   famotidine (PF) (PEPCID) injection 20 mg (20 mg IntraVENous Given 2/5/20 2048)   ondansetron (ZOFRAN) injection 4 mg (4 mg IntraVENous Given 2/5/20 2047)   ketorolac (TORADOL) injection 30 mg (30 mg IntraVENous Given 2/5/20 2123)   morphine injection 4 mg (4 mg IntraVENous Given 2/5/20 2123)   iopamidoL (ISOVUE 300) 61 % contrast injection 100 mL (93 mL IntraVENous Given 2/5/20 2155)         Lab findings:  Recent Results (from the past 12 hour(s))   CBC WITH AUTOMATED DIFF Collection Time: 02/05/20  8:38 PM   Result Value Ref Range    WBC 5.2 4.6 - 13.2 K/uL    RBC 4.18 (L) 4.20 - 5.30 M/uL    HGB 9.9 (L) 12.0 - 16.0 g/dL    HCT 32.2 (L) 35.0 - 45.0 %    MCV 77.0 74.0 - 97.0 FL    MCH 23.7 (L) 24.0 - 34.0 PG    MCHC 30.7 (L) 31.0 - 37.0 g/dL    RDW 17.1 (H) 11.6 - 14.5 %    PLATELET 287 674 - 499 K/uL    MPV 9.7 9.2 - 11.8 FL    NEUTROPHILS 76 (H) 40 - 73 %    LYMPHOCYTES 15 (L) 21 - 52 %    MONOCYTES 8 3 - 10 %    EOSINOPHILS 1 0 - 5 %    BASOPHILS 0 0 - 2 %    ABS. NEUTROPHILS 4.0 1.8 - 8.0 K/UL    ABS. LYMPHOCYTES 0.8 (L) 0.9 - 3.6 K/UL    ABS. MONOCYTES 0.4 0.05 - 1.2 K/UL    ABS. EOSINOPHILS 0.1 0.0 - 0.4 K/UL    ABS. BASOPHILS 0.0 0.0 - 0.1 K/UL    DF AUTOMATED     METABOLIC PANEL, COMPREHENSIVE    Collection Time: 02/05/20  8:38 PM   Result Value Ref Range    Sodium 139 136 - 145 mmol/L    Potassium 3.7 3.5 - 5.5 mmol/L    Chloride 107 100 - 111 mmol/L    CO2 26 21 - 32 mmol/L    Anion gap 6 3.0 - 18 mmol/L    Glucose 103 (H) 74 - 99 mg/dL    BUN 10 7.0 - 18 MG/DL    Creatinine 0.87 0.6 - 1.3 MG/DL    BUN/Creatinine ratio 11 (L) 12 - 20      GFR est AA >60 >60 ml/min/1.73m2    GFR est non-AA >60 >60 ml/min/1.73m2    Calcium 9.0 8.5 - 10.1 MG/DL    Bilirubin, total 0.4 0.2 - 1.0 MG/DL    ALT (SGPT) 28 13 - 56 U/L    AST (SGOT) 17 10 - 38 U/L    Alk.  phosphatase 62 45 - 117 U/L    Protein, total 7.5 6.4 - 8.2 g/dL    Albumin 3.8 3.4 - 5.0 g/dL    Globulin 3.7 2.0 - 4.0 g/dL    A-G Ratio 1.0 0.8 - 1.7     LIPASE    Collection Time: 02/05/20  8:38 PM   Result Value Ref Range    Lipase 115 73 - 393 U/L   URINALYSIS W/ RFLX MICROSCOPIC    Collection Time: 02/05/20  8:38 PM   Result Value Ref Range    Color 0      Appearance CLOUDY      Specific gravity 1.024 1.005 - 1.030      pH (UA) 5.5 5.0 - 8.0      Protein 30 (A) NEG mg/dL    Glucose NEGATIVE  NEG mg/dL    Ketone NEGATIVE  NEG mg/dL    Bilirubin NEGATIVE  NEG      Blood LARGE (A) NEG      Urobilinogen 0.2 0.2 - 1.0 EU/dL Nitrites NEGATIVE  NEG      Leukocyte Esterase TRACE (A) NEG     URINE MICROSCOPIC ONLY    Collection Time: 02/05/20  8:38 PM   Result Value Ref Range    WBC 0 to 3 0 - 4 /hpf    RBC 11 to 20 0 - 5 /hpf    Epithelial cells 3+ 0 - 5 /lpf    Bacteria 2+ (A) NEG /hpf   HCG URINE, QL    Collection Time: 02/05/20  8:38 PM   Result Value Ref Range    HCG urine, QL NEGATIVE  NEG         EKG interpretation by ED Physician:      X-Ray, CT or other radiology findings or impressions:  CT ABD PELV W CONT    (Results Pending)   Significant bowel wall thickening with mild fat stranding in the portion of the distal small bowel in the left lower quadrant. There is relative decompression of small bowel distal to this point as well as mild dilatation of the small bowel to 4.9 cm proximal to this with visualization of stool. This concerning for small bowel obstruction likely secondary to small bowel enteritis with a transition point enteritis is likely secondary to inflammatory bowel disease given prior history of Crohn's. Similar-appearing bulky slightly heterogeneous uterus. May represent fibroid uterus. Progress notes, Consult notes or additional Procedure notes:   Abdomen is soft here. Do not feel patient requires other work-up or imaging. Will place on antibiotics along with other medications to help with pain. Patient denies any prior history of Crohn's so I am not sure where this is coming from. I reviewed the chart as well as any prior history of this in the past.    I have discussed with patient and/or family/sig other the results, interpretation of any imaging if performed, suspected diagnosis and treatment plan to include instructions regarding the diagnoses listed to which understanding was expressed with all questions answered      Reevaluation of patient:   stable    Disposition:  Diagnosis:   1. Acute abdominal pain in left lower quadrant    2.  Non-intractable vomiting with nausea, unspecified vomiting type 3. Enteritis    4. Partial small bowel obstruction ([facility] [address]        Disposition: home      Follow-up Information     Follow up With Specialties Details Why [address]  Schedule an appointment as soon as possible for a visit  [id number] [address] EMERGENCY DEPT Emergency Medicine  If symptoms worsen [address]  [phone]            Patient's Medications   Start Taking    ACETAMINOPHEN (TYLENOL EXTRA STRENGTH) 500 MG TABLET    Take 2 Tabs by mouth every six (6) hours as needed for Pain. CIPROFLOXACIN HCL (CIPRO) 500 MG TABLET    Take 1 Tab by mouth two (2) times a day for 7 days. DICYCLOMINE (BENTYL) 10 MG CAPSULE    Take 2 Caps by mouth every six (6) hours as needed for Abdominal Cramps. METRONIDAZOLE (FLAGYL) 500 MG TABLET    Take 1 Tab by mouth two (2) times a day for 7 days. ONDANSETRON (ZOFRAN ODT) 4 MG DISINTEGRATING TABLET    Take 1-2 Tabs by mouth every eight (8) hours as needed for Nausea or Vomiting. TRAMADOL (ULTRAM) 50 MG TABLET    Take 1 Tab by mouth every eight (8) hours as needed for Pain for up to 3 days. Max Daily Amount: 150 mg.    Continue Taking    No medications on file   These Medications have changed    No medications on file   Stop Taking    No medications on file

## 2020-02-13 ENCOUNTER — HOSPITAL ENCOUNTER (OUTPATIENT)
Dept: LAB | Age: 50
Discharge: HOME | End: 2020-02-13
Payer: COMMERCIAL

## 2020-02-13 ENCOUNTER — OFFICE VISIT (OUTPATIENT)
Dept: FAMILY MEDICINE CLINIC | Age: 50
End: 2020-02-13

## 2020-02-13 VITALS
TEMPERATURE: 97.4 F | OXYGEN SATURATION: 100 % | BODY MASS INDEX: 38.09 KG/M2 | RESPIRATION RATE: 16 BRPM | DIASTOLIC BLOOD PRESSURE: 79 MMHG | HEART RATE: 66 BPM | WEIGHT: 194 LBS | SYSTOLIC BLOOD PRESSURE: 121 MMHG | HEIGHT: 60 IN

## 2020-02-13 DIAGNOSIS — D50.0 IRON DEFICIENCY ANEMIA DUE TO CHRONIC BLOOD LOSS: ICD-10-CM

## 2020-02-13 DIAGNOSIS — Z76.89 ENCOUNTER TO ESTABLISH CARE: ICD-10-CM

## 2020-02-13 DIAGNOSIS — K52.9 GASTROENTERITIS: Primary | ICD-10-CM

## 2020-02-13 DIAGNOSIS — K29.70 GASTRITIS, PRESENCE OF BLEEDING UNSPECIFIED, UNSPECIFIED CHRONICITY, UNSPECIFIED GASTRITIS TYPE: ICD-10-CM

## 2020-02-13 DIAGNOSIS — K52.9 ENTERITIS: ICD-10-CM

## 2020-02-13 LAB
ERYTHROCYTE [DISTWIDTH] IN BLOOD BY AUTOMATED COUNT: 20.4 % (ref 11.6–14.5)
FERRITIN SERPL-MCNC: 44 NG/ML (ref 8–388)
HCT VFR BLD AUTO: 32.8 % (ref 35–45)
HGB BLD-MCNC: 9.9 G/DL (ref 12–16)
IRON SATN MFR SERPL: 7 % (ref 20–50)
IRON SERPL-MCNC: 26 UG/DL (ref 50–175)
MCH RBC QN AUTO: 23.6 PG (ref 24–34)
MCHC RBC AUTO-ENTMCNC: 30.2 G/DL (ref 31–37)
MCV RBC AUTO: 78.1 FL (ref 74–97)
PLATELET # BLD AUTO: 396 K/UL (ref 135–420)
PMV BLD AUTO: 9.9 FL (ref 9.2–11.8)
RBC # BLD AUTO: 4.2 M/UL (ref 4.2–5.3)
TIBC SERPL-MCNC: 382 UG/DL (ref 250–450)
WBC # BLD AUTO: 2.8 K/UL (ref 4.6–13.2)

## 2020-02-13 PROCEDURE — 36415 COLL VENOUS BLD VENIPUNCTURE: CPT

## 2020-02-13 PROCEDURE — 86677 HELICOBACTER PYLORI ANTIBODY: CPT

## 2020-02-13 PROCEDURE — 85027 COMPLETE CBC AUTOMATED: CPT

## 2020-02-13 PROCEDURE — 82728 ASSAY OF FERRITIN: CPT

## 2020-02-13 PROCEDURE — 83540 ASSAY OF IRON: CPT

## 2020-02-13 RX ORDER — LANOLIN ALCOHOL/MO/W.PET/CERES
325 CREAM (GRAM) TOPICAL
COMMUNITY
End: 2020-03-30 | Stop reason: DRUGHIGH

## 2020-02-13 NOTE — PROGRESS NOTES
[facility]    CC: EOC for Abdominal Pain    HPI:     Abdominal Pain:  - Timing/onset: Started ~2 weeks ago  - Duration: Was constant  - Location: Generalized   - Context: Reports she was around her cousins who had a viral infection  - Associated Symptoms/signs: Nausea, vomiting, and diarrhea (Occurred on day of [employer] Bowl)  - Progression/Course: Improving. Was seen by ED for issue on 2/5/2020 and was diagnosed with enteritis and partial small bowel obstruction. Discharged on Cipro, metronidazole, Bentyl, tramadol, and acetaminophen regimen      Anemia:  -First diagnosed 2 years ago in [place]  -Secondary to heavy bleeding from period  -States that bleeding is due to fibroids  -Taking iron supplement for issue      ROS: Positive items marked in RED  CON: fever, chills  Cardiovascular: palpitations, CP  Resp: SOB, cough  GI: nausea, vomiting, diarrhea  : dysuria, hematuria    Past Medical History:   Diagnosis Date    Eczema     Iron deficiency anemia     Menorrhagia        Past Surgical History:   Procedure Laterality Date    HX BREAST REDUCTION  2000    HX TUBAL LIGATION Bilateral 1992       Family History   Problem Relation Age of Onset    Hypertension Mother     Diabetes Mother        Social History     Socioeconomic History    Marital status: SINGLE     Spouse name: Not on file    Number of children: Not on file    Years of education: Not on file    Highest education level: Not on file   Tobacco Use    Smoking status: Never Smoker    Smokeless tobacco: Never Used   Substance and Sexual Activity    Alcohol use: Yes     Comment: occassionally    Drug use: No    Sexual activity: Yes     Birth control/protection: Surgical       No Known Allergies      Current Outpatient Medications:     ferrous sulfate 325 mg (65 mg iron) tablet, Take 325 mg by mouth Daily (before breakfast). , Disp: , Rfl:     ondansetron (ZOFRAN ODT) 4 mg disintegrating tablet, Take 1-2 Tabs by mouth every eight (8) hours as needed for Nausea or Vomiting., Disp: 15 Tab, Rfl: 0    dicyclomine (BENTYL) 10 mg capsule, Take 2 Caps by mouth every six (6) hours as needed for Abdominal Cramps., Disp: 20 Cap, Rfl: 0    acetaminophen (TYLENOL EXTRA STRENGTH) 500 mg tablet, Take 2 Tabs by mouth every six (6) hours as needed for Pain., Disp: 50 Tab, Rfl: 0    Physical Exam:      /79   Pulse 66   Temp 97.4 °F (36.3 °C) (Oral)   Resp 16   Ht 5' (1.524 m)   Wt 194 lb (88 kg)   LMP 01/31/2020   SpO2 100%   BMI 37.89 kg/m²     General: 0bese habitus, NAD, conversant  Eyes: sclera clear bilaterally, no discharge noted, eyelids normal in appearance  HENT: NCAT  Lungs: CTAB, normal respiratory effort and rate  CV: RRR, no MRGs  ABD: soft, no guarding, non-distended, normal bowel sounds  Skin: normal temperature, turgor, color, and texture  Psych: alert and oriented to person, place and situation, normal affect  Neuro: speech normal, moving all extremities, gait normal      CT ABD PELV W CONT (2/5/2020): IMPRESSION:  Partial small bowel obstruction secondary to short segment of small bowel wall  thickening in the left lower quadrant. Findings nonspecific but could be related  to infectious or inflammatory enteritis. No free air or fluid collection.     Heterogeneous appearance of the cervix. Recommend physical exam/nonemergent  follow-up as needed. Results for Sivan Baum (MRN [id number]):   Ref.  Range 2/5/2020 20:38   WBC Latest Ref Range: 4.6 - 13.2 K/uL 5.2   RBC Latest Ref Range: 4.20 - 5.30 M/uL 4.18 (L)   HGB Latest Ref Range: 12.0 - 16.0 g/dL 9.9 (L)   HCT Latest Ref Range: 35.0 - 45.0 % 32.2 (L)   MCV Latest Ref Range: 74.0 - 97.0 FL 77.0   MCH Latest Ref Range: 24.0 - 34.0 PG 23.7 (L)   MCHC Latest Ref Range: 31.0 - 37.0 g/dL 30.7 (L)   RDW Latest Ref Range: 11.6 - 14.5 % 17.1 (H)   PLATELET Latest Ref Range: 135 - 420 K/uL 403   MPV Latest Ref Range: 9.2 - 11.8 FL 9.7   NEUTROPHILS Latest Ref Range: 40 - 73 % 76 (H)   LYMPHOCYTES Latest Ref Range: 21 - 52 % 15 (L)   MONOCYTES Latest Ref Range: 3 - 10 % 8   EOSINOPHILS Latest Ref Range: 0 - 5 % 1   BASOPHILS Latest Ref Range: 0 - 2 % 0   DF Latest Units:   AUTOMATED   ABS. NEUTROPHILS Latest Ref Range: 1.8 - 8.0 K/UL 4.0   ABS. LYMPHOCYTES Latest Ref Range: 0.9 - 3.6 K/UL 0.8 (L)   ABS. MONOCYTES Latest Ref Range: 0.05 - 1.2 K/UL 0.4   ABS. EOSINOPHILS Latest Ref Range: 0.0 - 0.4 K/UL 0.1   ABS.  BASOPHILS Latest Ref Range: 0.0 - 0.1 K/UL 0.0   Color Latest Units:   0   Appearance Latest Units:   CLOUDY   Specific gravity Latest Ref Range: 1.005 - 1.030   1.024   pH (UA) Latest Ref Range: 5.0 - 8.0   5.5   Protein Latest Ref Range: NEG mg/dL 30 (A)   Glucose Latest Ref Range: NEG mg/dL NEGATIVE   Ketone Latest Ref Range: NEG mg/dL NEGATIVE   Blood Latest Ref Range: NEG   LARGE (A)   Bilirubin Latest Ref Range: NEG   NEGATIVE   Urobilinogen Latest Ref Range: 0.2 - 1.0 EU/dL 0.2   Nitrites Latest Ref Range: NEG   NEGATIVE   Leukocyte Esterase Latest Ref Range: NEG   TRACE (A)   Epithelial cells Latest Ref Range: 0 - 5 /lpf 3+   WBC Latest Ref Range: 0 - 4 /hpf 0 to 3   RBC Latest Ref Range: 0 - 5 /hpf 11 to 20   Bacteria Latest Ref Range: NEG /hpf 2+ (A)   Sodium Latest Ref Range: 136 - 145 mmol/L 139   Potassium Latest Ref Range: 3.5 - 5.5 mmol/L 3.7   Chloride Latest Ref Range: 100 - 111 mmol/L 107   CO2 Latest Ref Range: 21 - 32 mmol/L 26   Anion gap Latest Ref Range: 3.0 - 18 mmol/L 6   Glucose Latest Ref Range: 74 - 99 mg/dL 103 (H)   BUN Latest Ref Range: 7.0 - 18 MG/DL 10   Creatinine Latest Ref Range: 0.6 - 1.3 MG/DL 0.87   BUN/Creatinine ratio Latest Ref Range: 12 - 20   11 (L)   Calcium Latest Ref Range: 8.5 - 10.1 MG/DL 9.0   GFR est non-AA Latest Ref Range: >60 ml/min/1.73m2 >60   GFR est AA Latest Ref Range: >60 ml/min/1.73m2 >60   Bilirubin, total Latest Ref Range: 0.2 - 1.0 MG/DL 0.4   Protein, total Latest Ref Range: 6.4 - 8.2 g/dL 7.5   Albumin Latest Ref Range: 3.4 - 5.0 g/dL 3.8   Globulin Latest Ref Range: 2.0 - 4.0 g/dL 3.7   A-G Ratio Latest Ref Range: 0.8 - 1.7   1.0   ALT (SGPT) Latest Ref Range: 13 - 56 U/L 28   AST Latest Ref Range: 10 - 38 U/L 17   Alk.  phosphatase Latest Ref Range: 45 - 117 U/L 62   Lipase Latest Ref Range: 73 - 393 U/L 115   HCG urine, QL Latest Ref Range: NEG   NEGATIVE         Assessment/Plan     Gastroenteritis:  -Likely viral etiology  -Counseled on diagnosis and recommend care  -H. pylori antibodies ordered  -Handout given on gastroenteritis care  -Follow-up in 1 month      Iron Deficiency Anemia:  -Secondary to menorrhagia  -CBC, ferritin, and iron profile ordered  -Follow-up in 1 month        Kylee Almeida MD  2/13/2020, 1:00 PM

## 2020-02-13 NOTE — PROGRESS NOTES
1. Have you been to the ER, urgent care clinic since your last visit? Hospitalized since your last visit? Yes When: 2-5-20 [facility] for stomach pain dx gastritis    2. Have you seen or consulted any other health care providers outside of the [address] [place] [address] since your last visit? Include any pap smears or colon screening. No      Informed patient Dr. Nikolas Gonsales does not prescribe controled medications.

## 2020-02-13 NOTE — PATIENT INSTRUCTIONS
Gastroenteritis: Care Instructions  Your Care Instructions    Gastroenteritis is an illness that may cause nausea, vomiting, and diarrhea. It is sometimes called \"stomach flu. \" It can be caused by bacteria or a virus. You will probably begin to feel better in 1 to 2 days. In the meantime, get plenty of rest and make sure you do not become dehydrated. Dehydration occurs when your body loses too much fluid. Follow-up care is a key part of your treatment and safety. Be sure to make and go to all appointments, and call your doctor if you are having problems. It's also a good idea to know your test results and keep a list of the medicines you take. How can you care for yourself at home? · If your doctor prescribed antibiotics, take them as directed. Do not stop taking them just because you feel better. You need to take the full course of antibiotics. · Drink plenty of fluids to prevent dehydration, enough so that your urine is light yellow or clear like water. Choose water and other caffeine-free clear liquids until you feel better. If you have kidney, heart, or liver disease and have to limit fluids, talk with your doctor before you increase your fluid intake. · Drink fluids slowly, in frequent, small amounts, because drinking too much too fast can cause vomiting. · Begin eating mild foods, such as dry toast, yogurt, applesauce, bananas, and rice. Avoid spicy, hot, or high-fat foods, and do not drink alcohol or caffeine for a day or two. Do not drink milk or eat ice cream until you are feeling better. How to prevent gastroenteritis  · Keep hot foods hot and cold foods cold. · Do not eat meats, dressings, salads, or other foods that have been kept at room temperature for more than 2 hours. · Use a thermometer to check your refrigerator. It should be between 34°F and 40°F.  · Defrost meats in the refrigerator or microwave, not on the kitchen counter. · Keep your hands and your kitchen clean.  Wash your hands, cutting boards, and countertops with hot soapy water frequently. · Cook meat until it is well done. · Do not eat raw eggs or uncooked sauces made with raw eggs. · Do not take chances. If food looks or tastes spoiled, throw it out. When should you call for help? Call 911 anytime you think you may need emergency care. For example, call if:    · You vomit blood or what looks like coffee grounds.     · You passed out (lost consciousness).     · You pass maroon or very bloody stools.    Call your doctor now or seek immediate medical care if:    · You have severe belly pain.     · You have signs of needing more fluids. You have sunken eyes, a dry mouth, and pass only a little dark urine.     · You feel like you are going to faint.     · You have increased belly pain that does not go away in 1 to 2 days.     · You have new or increased nausea, or you are vomiting.     · You have a new or higher fever.     · Your stools are black and tarlike or have streaks of blood.    Watch closely for changes in your health, and be sure to contact your doctor if:    · You are dizzy or lightheaded.     · You urinate less than usual, or your urine is dark yellow or brown.     · You do not feel better with each day that goes by. Where can you learn more? Go to http://manjeet-kai.info/. Enter N142 in the search box to learn more about \"Gastroenteritis: Care Instructions. \"  Current as of: June 9, 2019  Content Version: 12.2  © 6412-2543 [employer]. Care instructions adapted under license by [employer] [facility] (which disclaims liability or warranty for this information). If you have questions about a medical condition or this instruction, always ask your healthcare professional. [address] any warranty or liability for your use of this information.

## 2020-02-17 LAB
H PYLORI IGA SER-ACNC: <9 UNITS (ref 0–8.9)
H PYLORI IGG SER IA-ACNC: 1.05 INDEX VALUE (ref 0–0.79)
H PYLORI IGM SER-ACNC: <9 UNITS (ref 0–8.9)

## 2020-03-30 ENCOUNTER — VIRTUAL VISIT (OUTPATIENT)
Dept: FAMILY MEDICINE CLINIC | Age: 50
End: 2020-03-30

## 2020-03-30 DIAGNOSIS — R76.8 HELICOBACTER PYLORI ANTIBODY POSITIVE: ICD-10-CM

## 2020-03-30 DIAGNOSIS — K52.9 GASTROENTERITIS: ICD-10-CM

## 2020-03-30 DIAGNOSIS — D50.0 IRON DEFICIENCY ANEMIA DUE TO CHRONIC BLOOD LOSS: Primary | ICD-10-CM

## 2020-03-30 RX ORDER — FERROUS FUMARATE 324(106)MG
1 TABLET ORAL 2 TIMES DAILY
Qty: 60 TAB | Refills: 3 | Status: SHIPPED | OUTPATIENT
Start: 2020-03-30 | End: 2020-12-22

## 2020-03-30 NOTE — PROGRESS NOTES
[facility]    CC: Follow Up of Iron Deficiency Anemia and Suspected Gastroenteritis    HPI:     Nadia Gar is a 44 y.o. female who was seen by synchronous (real-time) audio-video technology on 3/30/2020. Consent:  She and/or her healthcare decision maker is aware that this patient-initiated Telehealth encounter is a billable service, with coverage as determined by her insurance carrier. She is aware that she may receive a bill and has provided verbal consent to proceed: Yes    I was in the office while conducting this encounter.       Gastroenteritis:  -Got requested lab work  -Reports full resolution of symptoms       Iron Deficiency Anemia:  -Got requested lab work  -Completed prior iron supplement regimen  -No known any side effects or issues with iron supplement  -Reports issues with menorrhagia has resolved      ROS: Positive items marked in RED  CON: fever, chills  Cardiovascular: palpitations, CP  Resp: SOB, cough  GI: nausea, vomiting, diarrhea  : dysuria, hematuria        Past Medical History:   Diagnosis Date    Eczema     Iron deficiency anemia     Menorrhagia        Past Surgical History:   Procedure Laterality Date    HX BREAST REDUCTION  2000    HX TUBAL LIGATION Bilateral 1992       Family History   Problem Relation Age of Onset    Hypertension Mother     Diabetes Mother        Social History     Socioeconomic History    Marital status: SINGLE     Spouse name: Not on file    Number of children: Not on file    Years of education: Not on file    Highest education level: Not on file   Tobacco Use    Smoking status: Never Smoker    Smokeless tobacco: Never Used   Substance and Sexual Activity    Alcohol use: Yes     Comment: occassionally    Drug use: No    Sexual activity: Yes     Birth control/protection: Surgical       No Known Allergies      Current Outpatient Medications:     ferrous fumarate 324 mg (106 mg iron) tab, Take 1 Tab by mouth two (2) times a day., Disp: 60 Tab, Rfl: 3    acetaminophen (TYLENOL EXTRA STRENGTH) 500 mg tablet, Take 2 Tabs by mouth every six (6) hours as needed for Pain., Disp: 50 Tab, Rfl: 0    ondansetron (ZOFRAN ODT) 4 mg disintegrating tablet, Take 1-2 Tabs by mouth every eight (8) hours as needed for Nausea or Vomiting., Disp: 15 Tab, Rfl: 0    dicyclomine (BENTYL) 10 mg capsule, Take 2 Caps by mouth every six (6) hours as needed for Abdominal Cramps., Disp: 20 Cap, Rfl: 0    Physical Exam:      General: Obese habitus, NAD, conversant  Eyes: sclera clear bilaterally, no discharge noted, eyelids normal in appearance, EOMI  HENT: NCAT  Neck: no masses visualized, trachea appears to be midline  Lungs: normal respiratory effort and rate, No visualized signs of difficulty breathing or respiratory distress  MS: Normal AROM of neck noted  Skin: no significant exanthematous lesions or discoloration noted on neck/facial skin    Psych: alert and oriented to person, place and situation, normal affect  Neuro: speech normal, moving all upper extremities, no gaze palsy, no facial asymmetry (Cranial nerve 7 motor function) (limited exam due to video visit)     Results for Juvenal Worrell (MRN [id number]):   Ref. Range 2/13/2020 13:22   WBC Latest Ref Range: 4.6 - 13.2 K/uL 2.8 (L)   RBC Latest Ref Range: 4.20 - 5.30 M/uL 4.20   HGB Latest Ref Range: 12.0 - 16.0 g/dL 9.9 (L)   HCT Latest Ref Range: 35.0 - 45.0 % 32.8 (L)   MCV Latest Ref Range: 74.0 - 97.0 FL 78.1   MCH Latest Ref Range: 24.0 - 34.0 PG 23.6 (L)   MCHC Latest Ref Range: 31.0 - 37.0 g/dL 30.2 (L)   RDW Latest Ref Range: 11.6 - 14.5 % 20.4 (H)   PLATELET Latest Ref Range: 135 - 420 K/uL 396   MPV Latest Ref Range: 9.2 - 11.8 FL 9.9   Iron Latest Ref Range: 50 - 175 ug/dL 26 (L)   TIBC Latest Ref Range: 250 - 450 ug/dL 382   Iron % saturation Latest Ref Range: 20 - 50 % 7 (L)   Ferritin Latest Ref Range: 8 - 388 NG/ML 44       H.  PYLORI ABS, IGG, IGA, IGM (2/13/2020):  Component Value Flag Ref Range Units Status   H. pylori Ab, IgG 1.05  High   0.00 - 0.79 Index Value Final   H. pylori Ab, IgA <9.0   0.0 - 8.9 units Final   H. pylori Ab, IgM <9.0   0.0 - 8.9 units Final         Assessment/Plan     Gastroenteritis, Resolved:  -Likely viral etiology. DDx diagnosis includes she had gastritis symptoms 2/2 H. pylori infection given positive H. pylori antibody test  -Counseled on positive H. Pylori AB test result and recommendations for further evaluation  -Breath urea test ordered  -Patient expressed understanding of the plan. -Advised to contact the office as needed if her symptoms return  -Information on H. pylori tests sent to her [employer] account  -Follow-up in 1 month        Iron Deficiency Anemia:  -Likely secondary to menorrhagia  -Test results reviewed  -Started on new iron supplement regimen  -Patient expressed understanding with the plan. -Follow-up in 1 month        Elma Velasquez MD  3/30/2020, 10:36 AM        Pursuant to the emergency declaration under the [facility], [facility] waiver authority and the [employer] Act, this Virtual  Visit was conducted, with patient's consent, to reduce the patient's risk of exposure to COVID-19 and provide continuity of care for an established patient. Services were provided through a video synchronous discussion virtually to substitute for in-person clinic visit.

## 2020-03-30 NOTE — PATIENT INSTRUCTIONS
H. Pylori: About This Test 
What is it? H. pylori tests are used to check for a Helicobacter pylori bacteria infection in the stomach and upper part of the small intestine. H. pylori can cause peptic ulcers. But most people with this type of bacteria in their digestive systems do not get ulcers. Different tests may be used to check for an H. pylori infection. · Blood antibody test. This checks to see if your body has made antibodies to fight H. pylori bacteria. · Urea breath test. It tests your breath to see if you have H. pylori bacteria in your stomach. · Stool antigen test. This test looks for substances in your feces (stool) that trigger the immune system to fight an H. pylori infection. (These substances are called H. pylori antigens.) · Stomach biopsy. A small sample (biopsy) is taken from the lining of your stomach and small intestine. The samples are checked for H. pylori. Why is this test done? H. pylori tests are done to: · Find out if an infection with H. pylori bacteria may be causing an ulcer or irritation of the stomach lining (gastritis). · Find out if treatment for the infection worked. How can you prepare for the test? 
Blood antibody test 
· You do not need to do anything before you have this test. Urea breath test, stool antigen test, or stomach biopsy · Medicines you take may change the results of these tests. Be sure to tell your doctor about all the prescription and over-the-counter medicines you take. Your doctor may advise you to stop taking some of your medicines. Urea breath test or stomach biopsy · You will be asked to not eat or drink anything for a certain amount of time before your breath test or stomach biopsy. Follow your doctor's instructions about how long you need to avoid eating and drinking before the test. If you are going to have a stomach biopsy, your doctor will give you instructions on how to prepare.  
What happens during the test? 
Blood antibody test 
 · A health professional takes a sample of your blood. Urea breath test 
A breath sample is collected when you blow into a balloon or blow bubbles into a bottle of liquid. The health professional will: · Collect a sample of your breath before the test starts. · Give you a capsule or some water to swallow that contains tagged or radioactive material. 
· Collect more samples of your breath. The samples will be tested to see if they contain material formed when H. pylori comes into contact with the tagged or radioactive material. 
Stool antigen test 
For this test, you may be asked to collect the stool sample at home. To collect the sample, you need to: 
· Pass stool into a dry container. Either solid or liquid stools can be collected. Be careful not to get urine or toilet tissue in with the stool sample. · Replace the container cap. Label the container with your name, your doctor's name, and the date the sample was collected. · Wash your hands well after you collect the sample. · Take the sealed container to your doctor's office or to the lab as soon as you can. Stomach biopsy · A procedure called endoscopy is used to collect samples of tissue from the stomach and the first part of the small intestine. The tissue samples are tested in a lab to see if they contain H. pylori. Follow-up care is a key part of your treatment and safety. Be sure to make and go to all appointments, and call your doctor if you are having problems. It's also a good idea to keep a list of the medicines you take. Ask your doctor when you can expect to have your test results. Where can you learn more? Go to http://manjeet-kai.info/ Enter J078 in the search box to learn more about \"H. Pylori: About This Test.\" Current as of: December 8, 2019Content Version: 12.4 © 3393-8427 [employer].  
Care instructions adapted under license by [employer] (which disclaims liability or warranty for this information). If you have questions about a medical condition or this instruction, always ask your healthcare professional. [address] any warranty or liability for your use of this information.

## 2020-04-29 ENCOUNTER — VIRTUAL VISIT (OUTPATIENT)
Dept: FAMILY MEDICINE CLINIC | Age: 50
End: 2020-04-29

## 2020-04-29 DIAGNOSIS — R10.9 ABDOMINAL CRAMPS: Primary | ICD-10-CM

## 2020-04-29 DIAGNOSIS — D50.0 IRON DEFICIENCY ANEMIA DUE TO CHRONIC BLOOD LOSS: ICD-10-CM

## 2020-04-29 DIAGNOSIS — R76.8 HELICOBACTER PYLORI ANTIBODY POSITIVE: ICD-10-CM

## 2020-04-29 DIAGNOSIS — N92.1 MENORRHAGIA WITH IRREGULAR CYCLE: ICD-10-CM

## 2020-04-29 RX ORDER — DICYCLOMINE HYDROCHLORIDE 10 MG/1
20 CAPSULE ORAL
Qty: 30 CAP | Refills: 1 | Status: SHIPPED | OUTPATIENT
Start: 2020-04-29 | End: 2020-07-20 | Stop reason: SDUPTHER

## 2020-04-29 RX ORDER — NORETHINDRONE 5 MG/1
5 TABLET ORAL DAILY
COMMUNITY
Start: 2020-04-06 | End: 2020-12-22

## 2020-04-29 NOTE — PROGRESS NOTES
Chucky Mei [facility]    CC: F/U of Iron Deficiency Anemia, Menorrhagia, and Positive H. Pylori Antibody Test    HPI:     Consent: Nadia Mendoza, who was seen by synchronous (real-time) audio-video technology, and/or her healthcare decision maker, is aware that this patient-initiated, Telehealth encounter on 4/29/2020 is a billable service, with coverage as determined by her insurance carrier. She is aware that she may receive a bill and has provided verbal consent to proceed: Yes.        Iron Deficiency Anemia:  -Taking iron supplement as prescribed  -Reports occasional nausea from the supplement  -Reports that her menorrhagia has resolved      Menorrhagia:  -Seeing Dr. Sabina Patel gynecology for issue  -Last saw gynecologist in March  -Was originally scheduled to have a hysterectomy, but it was canceled due to COVID-19 pandemic  -Was started on norethindrone regimen  -Has been taking medication as prescribed  -Reports she has had no heavy bleeding  -Next appointment with gynecologist will be in late June or early July       Positive H. Pylor Antibody Test:  -Did not get requested breath test  -Reports that the previous abdominal pain has returned  -States that it is less severe than previous episode   -Reports that it is cramping located under her stomach  -Has been eating more vegetables which she feels has helped  -Pain is alleviated by Bentyl      ROS: Positive items marked in RED  CON: fever, chills  Cardiovascular: palpitations, CP  Resp: SOB, cough  GI: nausea, vomiting, diarrhea  : dysuria, hematuria      Past Medical History:   Diagnosis Date    Eczema     Iron deficiency anemia     Menorrhagia        Past Surgical History:   Procedure Laterality Date    HX BREAST REDUCTION  2000    HX TUBAL LIGATION Bilateral 1992       Family History   Problem Relation Age of Onset    Hypertension Mother     Diabetes Mother        Social History     Socioeconomic History    Marital status: SINGLE     Spouse name: Not on file    Number of children: Not on file    Years of education: Not on file    Highest education level: Not on file   Tobacco Use    Smoking status: Never Smoker    Smokeless tobacco: Never Used   Substance and Sexual Activity    Alcohol use: Yes     Comment: occassionally    Drug use: No    Sexual activity: Yes     Birth control/protection: Surgical       No Known Allergies      Current Outpatient Medications:     ferrous fumarate 324 mg (106 mg iron) tab, Take 1 Tab by mouth two (2) times a day., Disp: 60 Tab, Rfl: 3    dicyclomine (BENTYL) 10 mg capsule, Take 2 Caps by mouth every six (6) hours as needed for Abdominal Cramps., Disp: 20 Cap, Rfl: 0    norethindrone acetate (AYGESTIN) 5 mg tablet, , Disp: , Rfl:     ondansetron (ZOFRAN ODT) 4 mg disintegrating tablet, Take 1-2 Tabs by mouth every eight (8) hours as needed for Nausea or Vomiting., Disp: 15 Tab, Rfl: 0    acetaminophen (TYLENOL EXTRA STRENGTH) 500 mg tablet, Take 2 Tabs by mouth every six (6) hours as needed for Pain., Disp: 50 Tab, Rfl: 0    Physical Exam:      General: obese habitus, NAD, conversant  Eyes: sclera clear bilaterally, no discharge noted, eyelids normal in appearance, EOMI  HENT: NCAT  Neck: no masses visualized, trachea appears to be midline  Lungs: normal respiratory effort and rate, No visualized signs of difficulty breathing or respiratory distress  MS: normal AROM of neck noted  Skin: no significant exanthematous lesions or discoloration noted on neck/facial skin    Psych: alert and oriented to person, place and situation, normal affect  Neuro: speech normal, moving all upper extremities, no gaze palsy, no facial asymmetry (Cranial nerve 7 motor function) (limited exam due to video visit)      Assessment/Plan     Abdominal Cramps:  -Suspect issue is 2/2 her fibroids.  DDx includes H. pylori infection  -Started on Bentyl regimen  -Re-ordered H. pylori breath test  -F/U in 3 weeks      Iron Deficiency Anemia:  -2/2 menorrhagia  -Likely improved given current iron supplementation and cessation of menorrhagia  -Will continue current iron supplement  -CBC ordered  -F/U in 3 weeks      Menorrhagia, well controlled:  -2/2 fibroids  -Will defer management to gynecology  -Need to obtain records from gynecologist office for review  -F/U in 3 weeks      April Bailee Burton is a 44 y.o. female being evaluated by a Virtual Visit (video visit) encounter to address concerns as mentioned above. A caregiver was present when appropriate. Due to this being a TeleHealth encounter (During [facility]-13 public health emergency), evaluation of the following organ systems was limited: Vitals/Constitutional/EENT/Resp/CV/GI//MS/Neuro/Skin/Heme-Lymph-Imm. Pursuant to the emergency declaration under the [address] [place] [address], [address] [place] [address] [facility] authority and the [employer] and [employer] Act, this Virtual Visit was conducted with patient's (and/or legal guardian's) consent, to reduce the risk of exposure to COVID-19 and provide necessary medical care. Services were provided through a video synchronous discussion virtually to substitute for in-person encounter. --Shady Gaspar MD on 4/29/2020 at 9:49 AM    An electronic signature was used to authenticate this note.

## 2020-04-29 NOTE — PROGRESS NOTES
1. Have you been to the ER, urgent care clinic since your last visit? Hospitalized since your last visit? No    2. Have you seen or consulted any other health care providers outside of the [address] [place] [address] since your last visit? Include any pap smears or colon screening.  No

## 2020-05-13 LAB
ERYTHROCYTE [DISTWIDTH] IN BLOOD BY AUTOMATED COUNT: 16.5 % (ref 11–15)
HCT VFR BLD AUTO: 39.1 % (ref 35–45)
HELICOBACTER PYLORI, UREA BREATH TEST: NOT DETECTED
HGB BLD-MCNC: 12.8 G/DL (ref 11.7–15.5)
MCH RBC QN AUTO: 27.7 PG (ref 27–33)
MCHC RBC AUTO-ENTMCNC: 32.7 G/DL (ref 32–36)
MCV RBC AUTO: 84.6 FL (ref 80–100)
PLATELET # BLD AUTO: 269 THOUSAND/UL (ref 140–400)
PMV BLD AUTO: 10.2 FL (ref 7.5–12.5)
RBC # BLD AUTO: 4.62 MILLION/UL (ref 3.8–5.1)
WBC # BLD AUTO: 3.7 THOUSAND/UL (ref 3.8–10.8)

## 2020-07-15 ENCOUNTER — APPOINTMENT (OUTPATIENT)
Dept: GENERAL RADIOLOGY | Age: 50
End: 2020-07-15
Attending: PHYSICIAN ASSISTANT
Payer: COMMERCIAL

## 2020-07-15 ENCOUNTER — HOSPITAL ENCOUNTER (EMERGENCY)
Age: 50
Discharge: HOME | End: 2020-07-15
Attending: EMERGENCY MEDICINE
Payer: COMMERCIAL

## 2020-07-15 VITALS
TEMPERATURE: 99.7 F | RESPIRATION RATE: 16 BRPM | OXYGEN SATURATION: 100 % | DIASTOLIC BLOOD PRESSURE: 57 MMHG | SYSTOLIC BLOOD PRESSURE: 126 MMHG | HEART RATE: 86 BPM

## 2020-07-15 DIAGNOSIS — Z20.822 SUSPECTED COVID-19 VIRUS INFECTION: Primary | ICD-10-CM

## 2020-07-15 PROCEDURE — 71045 X-RAY EXAM CHEST 1 VIEW: CPT

## 2020-07-15 PROCEDURE — 99282 EMERGENCY DEPT VISIT SF MDM: CPT

## 2020-07-15 PROCEDURE — 87635 SARS-COV-2 COVID-19 AMP PRB: CPT

## 2020-07-15 RX ORDER — PSEUDOEPHEDRINE HCL 120 MG/1
120 TABLET, FILM COATED, EXTENDED RELEASE ORAL
Qty: 14 TAB | Refills: 0 | Status: SHIPPED | OUTPATIENT
Start: 2020-07-15 | End: 2020-12-22

## 2020-07-15 RX ORDER — BENZONATATE 100 MG/1
100 CAPSULE ORAL
Qty: 30 CAP | Refills: 0 | Status: SHIPPED | OUTPATIENT
Start: 2020-07-15 | End: 2020-07-22

## 2020-07-15 NOTE — ED TRIAGE NOTES
\"I work at [employer] and they have a big outbreak of covid and I have a headache and congestion. \"

## 2020-07-15 NOTE — ED PROVIDER NOTES
EMERGENCY DEPARTMENT HISTORY AND PHYSICAL EXAM    Date: 7/15/2020  Patient Name: Nadia Kelly    History of Presenting Illness     Chief Complaint   Patient presents with    Concern For COVID-19 (Coronavirus)         History Provided By:patient    Chief Complaint: possible COVID  Duration: few days  Timing:acute  Location: n/a  Quality:n/a  Severity moderate  Modifying Factors: none   Associated Symptoms: loss of taste/smell, cough, congestion       Additional History (Context): Nadia Aviles is a 44 y.o. female with PMH eczema who presents with c/o a few days of dry cough, loss of taste/smell, and congestion. Denies tx PTA. Pt states she works at [employer] and has been exposed to [place]. No other complaints. PCP: Kim العلي MD    Current Outpatient Medications   Medication Sig Dispense Refill    benzonatate (Tessalon Perles) 100 mg capsule Take 1 Cap by mouth three (3) times daily as needed for Cough for up to 7 days. 30 Cap 0    pseudoephedrine CR (Sudafed 12 Hour) 120 mg CR tablet Take 1 Tab by mouth two (2) times daily as needed for Congestion. 14 Tab 0    dicyclomine (BentyL) 10 mg capsule Take 2 Caps by mouth every six (6) hours as needed for Abdominal Cramps. 30 Cap 1    norethindrone acetate (AYGESTIN) 5 mg tablet Take 5 mg by mouth daily.  ferrous fumarate 324 mg (106 mg iron) tab Take 1 Tab by mouth two (2) times a day. 60 Tab 3    ondansetron (ZOFRAN ODT) 4 mg disintegrating tablet Take 1-2 Tabs by mouth every eight (8) hours as needed for Nausea or Vomiting. 15 Tab 0    acetaminophen (TYLENOL EXTRA STRENGTH) 500 mg tablet Take 2 Tabs by mouth every six (6) hours as needed for Pain.  48 Tab 0       Past History     Past Medical History:  Past Medical History:   Diagnosis Date    Eczema     Iron deficiency anemia     Menorrhagia        Past Surgical History:  Past Surgical History:   Procedure Laterality Date    HX BREAST REDUCTION  2000    HX TUBAL LIGATION Bilateral 1992 Family History:  Family History   Problem Relation Age of Onset    Hypertension Mother     Diabetes Mother        Social History:  Social History     Tobacco Use    Smoking status: Never Smoker    Smokeless tobacco: Never Used   Substance Use Topics    Alcohol use: Yes     Comment: occassionally    Drug use: No       Allergies:  No Known Allergies      Review of Systems   Review of Systems   Constitutional: Negative for chills and fever. Loss of taste/smell   HENT: Positive for congestion. Negative for ear pain and rhinorrhea. Eyes: Negative. Negative for pain and redness. Respiratory: Positive for cough. Negative for shortness of breath, wheezing and stridor. Cardiovascular: Negative. Negative for chest pain and leg swelling. Gastrointestinal: Negative. Negative for abdominal pain, constipation, diarrhea, nausea and vomiting. Genitourinary: Negative. Negative for dysuria and frequency. Musculoskeletal: Negative. Negative for back pain and neck pain. Skin: Negative. Negative for rash and wound. Neurological: Positive for headaches. Negative for dizziness, seizures and syncope. All other systems reviewed and are negative. All Other Systems Negative  Physical Exam     Vitals:    07/15/20 1544   BP: 126/57   Pulse: 86   Resp: 16   Temp: 99.7 °F (37.6 °C)   SpO2: 100%     Physical Exam  Vitals signs and nursing note reviewed. Constitutional:       General: She is not in acute distress. Appearance: She is well-developed. She is not diaphoretic. HENT:      Head: Normocephalic and atraumatic. Nose: Congestion present. No rhinorrhea. Mouth/Throat:      Mouth: Mucous membranes are moist.   Eyes:      General: No scleral icterus. Right eye: No discharge. Left eye: No discharge. Conjunctiva/sclera: Conjunctivae normal.   Neck:      Musculoskeletal: Normal range of motion and neck supple.    Cardiovascular:      Rate and Rhythm: Normal rate and regular rhythm. Heart sounds: Normal heart sounds. No murmur. No friction rub. No gallop. Pulmonary:      Effort: Pulmonary effort is normal. No respiratory distress. Breath sounds: Normal breath sounds. No stridor. No wheezing, rhonchi or rales. Musculoskeletal: Normal range of motion. Skin:     General: Skin is warm and dry. Findings: No erythema or rash. Neurological:      Mental Status: She is alert and oriented to person, place, and time. Coordination: Coordination normal.      Comments: Gait is steady and patient exhibits no evidence of ataxia. Patient is able to ambulate without difficulty. No focal neurological deficit noted. No facial droop, slurred speech, or evidence of altered mentation noted on exam.     Psychiatric:         Behavior: Behavior normal.         Thought Content: Thought content normal.              Diagnostic Study Results     Labs -   No results found for this or any previous visit (from the past 12 hour(s)). Radiologic Studies -   XR CHEST PORT    (Results Pending)     CT Results  (Last 48 hours)    None        CXR Results  (Last 48 hours)    None            Medical Decision Making   I am the first provider for this patient. I reviewed the vital signs, available nursing notes, past medical history, past surgical history, family history and social history. Vital Signs-Reviewed the patient's vital signs. Records Reviewed: Nadia Polanco PA-C     Procedures:  Procedures    Provider Notes (Medical Decision Making): Impression:   suspected COVID    Chest x-ray negative, novel coronavirus test sent out. Will plan to d.c with symptomatic tx and PCP follow-up. Pt agrees. Nadia Polanco PA-C   MED RECONCILIATION:  No current facility-administered medications for this encounter.       Current Outpatient Medications   Medication Sig    benzonatate (Tessalon Perles) 100 mg capsule Take 1 Cap by mouth three (3) times daily as needed for Cough for up to 7 days.  pseudoephedrine CR (Sudafed 12 Hour) 120 mg CR tablet Take 1 Tab by mouth two (2) times daily as needed for Congestion.  dicyclomine (BentyL) 10 mg capsule Take 2 Caps by mouth every six (6) hours as needed for Abdominal Cramps.  norethindrone acetate (AYGESTIN) 5 mg tablet Take 5 mg by mouth daily.  ferrous fumarate 324 mg (106 mg iron) tab Take 1 Tab by mouth two (2) times a day.  ondansetron (ZOFRAN ODT) 4 mg disintegrating tablet Take 1-2 Tabs by mouth every eight (8) hours as needed for Nausea or Vomiting.  acetaminophen (TYLENOL EXTRA STRENGTH) 500 mg tablet Take 2 Tabs by mouth every six (6) hours as needed for Pain. Disposition:  D/c    DISCHARGE NOTE:   Patient is stable for discharge at this time. I have discussed all the findings from today's work up with the patient, including lab results and imaging. I have answered all questions. Rx for tessalon and sudafed given. Rest and close follow-up with the PCP recommended this week. Return to the ED immediately for any new or worsening symptoms. Nadia Polanco PA-C     Follow-up Information     Follow up With Specialties Details Why Contact Info    Hardeep Obrien MD [facility] Schedule an appointment as soon as possible for a visit in 1 week  [place] [phone]      [facility] EMERGENCY DEPT Emergency Medicine  As needed, If symptoms worsen [address]  [phone]          Current Discharge Medication List      START taking these medications    Details   benzonatate (Tessalon Perles) 100 mg capsule Take 1 Cap by mouth three (3) times daily as needed for Cough for up to 7 days. Qty: 30 Cap, Refills: 0      pseudoephedrine CR (Sudafed 12 Hour) 120 mg CR tablet Take 1 Tab by mouth two (2) times daily as needed for Congestion. Qty: 14 Tab, Refills: 0               Diagnosis     Clinical Impression:   1.  Suspected COVID-19 virus infection

## 2020-07-15 NOTE — LETTER
NOTIFICATION OF RETURN TO WORK / SCHOOL 
 
7/15/2020 Ms. Adina Stanley [address] [facility] [address]   [address] [place] [phone] To Whom It May Concern: 
 
Adina Stanley was seen in the ED on 7/15/2020 and may be excused from work for 1 week, unless her COVID test results negative and she is asymptomatic. Sincerely, Nadia Polanco PA-C

## 2020-07-15 NOTE — DISCHARGE INSTRUCTIONS
[employer] Activation    Thank you for requesting access to [facility]. Please follow the instructions below to securely access and download your online medical record. [facility] allows you to send messages to your doctor, view your test results, renew your prescriptions, schedule appointments, and more. How Do I Sign Up? 1. In your internet browser, go to [email or web address]  2. Click on the First Time User? Click Here link in the Sign In box. You will be redirect to the New Member Sign Up page. 3. Enter your [facility] Access Code exactly as it appears below. You will not need to use this code after youve completed the sign-up process. If you do not sign up before the expiration date, you must request a new code. [facility] Access Code: Activation code not generated  Current [facility] Status: Active (This is the date your [facility] access code will )    4. Enter the last four digits of your Social Security Number (xxxx) and Date of Birth (mm/dd/yyyy) as indicated and click Submit. You will be taken to the next sign-up page. 5. Create a [facility] ID. This will be your [facility] login ID and cannot be changed, so think of one that is secure and easy to remember. 6. Create a [facility] password. You can change your password at any time. 7. Enter your Password Reset Question and Answer. This can be used at a later time if you forget your password. 8. Enter your e-mail address. You will receive e-mail notification when new information is available in [address] 9. Click Sign Up. You can now view and download portions of your medical record. 10. Click the Download Summary menu link to download a portable copy of your medical information. Additional Information    If you have questions, please visit the Frequently Asked Questions section of the [facility] [facility] website at [email or web address] Remember, [facility] is NOT to be used for urgent needs. For medical emergencies, dial 911.

## 2020-07-15 NOTE — LETTER
[address] [facility] EMERGENCY DEPT 
[address] 
[address] [facility] [place] [address] [phone] Work/School Note Date: 7/15/2020 To Whom It May concern: 
 
Nadia Hardin was seen and treated today in the emergency room by the following provider(s): 
Attending Provider: Abdiaziz Baird MD 
Physician Assistant: DONOVAN Enriquezdanuta Araujo may return to work on 7/29/2020 or sooner, if Covid test is negative and without symptoms. Sincerely, Adore Salguero RN

## 2020-07-16 ENCOUNTER — PATIENT OUTREACH (OUTPATIENT)
Dept: CASE MANAGEMENT | Age: 50
End: 2020-07-16

## 2020-07-16 NOTE — PROGRESS NOTES
Care Transitions Nurse ( CTN) attempted to contact patient via telephone call regarding recent discharge and Covid-19 risk education. There was no response. A voicemail message was left requesting a non-emergency return telephone call. CTN  contact information provided.

## 2020-07-17 ENCOUNTER — PATIENT OUTREACH (OUTPATIENT)
Dept: CASE MANAGEMENT | Age: 50
End: 2020-07-17

## 2020-07-17 NOTE — PROGRESS NOTES
Care Transitions Nurse ( CTN)  Attempted to reach patient via telephone call for follow up. A female answered and stated she was patient's daughter. Patient is not currently available. CTN indicated this was not an emergency telephone call.   CTN to follow up at another time

## 2020-07-20 ENCOUNTER — VIRTUAL VISIT (OUTPATIENT)
Dept: FAMILY MEDICINE CLINIC | Age: 50
End: 2020-07-20

## 2020-07-20 DIAGNOSIS — D50.0 IRON DEFICIENCY ANEMIA DUE TO CHRONIC BLOOD LOSS: ICD-10-CM

## 2020-07-20 DIAGNOSIS — U07.1 COVID-19: Primary | ICD-10-CM

## 2020-07-20 DIAGNOSIS — R10.9 ABDOMINAL CRAMPS: ICD-10-CM

## 2020-07-20 RX ORDER — DICYCLOMINE HYDROCHLORIDE 10 MG/1
20 CAPSULE ORAL
Qty: 90 CAP | Refills: 2 | Status: SHIPPED | OUTPATIENT
Start: 2020-07-20 | End: 2020-12-22

## 2020-07-20 NOTE — PROGRESS NOTES
1st attempt- 11:38am- No answer. Left message to return call for check in prior to virtual appointment. 12:45pm    1. Have you been to the ER, urgent care clinic since your last visit? Hospitalized since your last visit? Yes 7-15-20 [facility] for headache and congestion; COVID Concern    2. Have you seen or consulted any other health care providers outside of the [address] [place] [address] since your last visit? Include any pap smears or colon screening.  No    Patient informed of POSITIVE COVID 19 test.

## 2020-07-20 NOTE — PROGRESS NOTES
[facility]    CC: COVID-19 Infection and follow-up of Chronic Disease M knee pain knee pain  We will resting of on bunion on the mom anagement    HPI:     Nadia Toney, who was evaluated through a synchronous (real-time) audio-video encounter, and/or her healthcare decision maker, is aware that it is a billable service, with coverage as determined by her insurance carrier. She provided verbal consent to proceed: Yes, and patient identification was verified. It was conducted pursuant to the emergency declaration under the [facility], [facility] authority and the [employer] Act. A caregiver was present when appropriate. Ability to conduct physical exam was limited. I was in the office. The patient was at home. COVID-19 infection:  -Started hav symptos last Sunday  -itchy trhroat, lost smell and tast, slight cough, stuffy nose  -Reporst she feels colleen, but still has no smell or tstra  -works at [employer] and there has an outbreak of COVID-19      Abdominal Cramps:  -Suspect issue is 2/2 her fibroids.  DDx includes H. pylori infection  -Started on Bentyl regimen  -Re-ordered H. pylori breath test  -helped   -no side effects  -F/U in 3 weeks        Iron Deficiency Anemia:  -2/2 menorrhagia  -saw gynecologist last week  -CBC was cheched and it was in the HGB was 9s  -Likely improved given current iron supplementation and cessation of menorrhagia  -Will continue current iron supplement  -CBC ordered  -F/U in 3 weeks        ROS: Positive items marked in RED  CON: fever, chills  Cardiovascular: palpitations, CP  Resp: SOB, cough  GI: nausea, vomiting, diarrhea  : dysuria, hematuria      Past Medical History:   Diagnosis Date    Eczema     Iron deficiency anemia     Menorrhagia        Past Surgical History:   Procedure Laterality Date    HX BREAST REDUCTION  2000    HX TUBAL LIGATION Bilateral 1992 Family History   Problem Relation Age of Onset    Hypertension Mother     Diabetes Mother        Social History     Tobacco Use    Smoking status: Never Smoker    Smokeless tobacco: Never Used   Substance Use Topics    Alcohol use: Yes     Comment: occassionally    Drug use: No       No Known Allergies      Current Outpatient Medications:     benzonatate (Tessalon Perles) 100 mg capsule, Take 1 Cap by mouth three (3) times daily as needed for Cough for up to 7 days. , Disp: 30 Cap, Rfl: 0    pseudoephedrine CR (Sudafed 12 Hour) 120 mg CR tablet, Take 1 Tab by mouth two (2) times daily as needed for Congestion. , Disp: 14 Tab, Rfl: 0    norethindrone acetate (AYGESTIN) 5 mg tablet, Take 5 mg by mouth daily. , Disp: , Rfl:     ferrous fumarate 324 mg (106 mg iron) tab, Take 1 Tab by mouth two (2) times a day., Disp: 60 Tab, Rfl: 3    ondansetron (ZOFRAN ODT) 4 mg disintegrating tablet, Take 1-2 Tabs by mouth every eight (8) hours as needed for Nausea or Vomiting., Disp: 15 Tab, Rfl: 0    acetaminophen (TYLENOL EXTRA STRENGTH) 500 mg tablet, Take 2 Tabs by mouth every six (6) hours as needed for Pain., Disp: 50 Tab, Rfl: 0    dicyclomine (BentyL) 10 mg capsule, Take 2 Caps by mouth every six (6) hours as needed for Abdominal Cramps., Disp: 30 Cap, Rfl: 1    Physical Exam:      General: Obese habitus all, NAD, conversant  Eyes: sclera clear bilaterally, no discharge noted, eyelids normal in appearance, EOMI  HENT: NCAT  Neck: no masses visualized, trachea appears to be midline  Lungs: normal respiratory effort and rate, No visualized signs of difficulty breathing or respiratory distress  MS: normal AROM of neck noted  Skin: no significant exanthematous lesions or discoloration noted on neck/facial skin    Psych: alert and oriented to person, place and situation, normal affect  Neuro: speech normal, moving all upper extremities, no gaze palsy, no facial asymmetry (Cranial nerve 7 motor function) (limited exam due to video visit)    Results for Sal Rayo (MRN [id number]) as of 7/20/2020 12:42   Ref. Range 5/12/2020 09:17 7/15/2020 16:10 7/15/2020 16:29   WBC Latest Ref Range: 3.8 - 10.8 Thousand/uL 3.7 (L)     RBC Latest Ref Range: 3.80 - 5.10 Million/uL 4.62     HGB Latest Ref Range: 11.7 - 15.5 g/dL 12.8     HCT Latest Ref Range: 35.0 - 45.0 % 39.1     MCV Latest Ref Range: 80.0 - 100.0 fL 84.6     MCH Latest Ref Range: 27.0 - 33.0 pg 27.7     MCHC Latest Ref Range: 32.0 - 36.0 g/dL 32.7     RDW Latest Ref Range: 11.0 - 15.0 % 16.5 (H)     PLATELET Latest Ref Range: 140 - 400 Thousand/uL 269     MEAN PLATELET VOLUME Latest Ref Range: 7.5 - 12.5 fL 10.2     H. pylori, Urea breath test Latest Ref Range: NOT DETECTED  NOT DETECTED     H. PYLORI BREATH TEST Unknown Rpt     NOVEL CORONAVIRUS (COVID-19) Unknown  Rpt (A)    XR CHEST PORT Unknown   Rpt       Assessment/Plan     COVID-19 infection:          Abdominal Cramps:  -Suspect issue is 2/2 her fibroids. DDx includes H. pylori infection  -Started on Bentyl regimen  -Re-ordered H. pylori breath test  -F/U in 3 weeks        Iron Deficiency Anemia:  -2/2 menorrhagia  -Likely improved given current iron supplementation and cessation of menorrhagia  -Will continue current iron supplement  -CBC ordered  -F/U in 3 weeks  Health Maintenance:  -      April Fuad is a 44 y.o. female being evaluated by a Virtual Visit (video visit) encounter to address concerns as mentioned above. A caregiver was present when appropriate. Due to this being a TeleHealth encounter (During [facility] public health emergency), evaluation of the following organ systems was limited: Vitals/Constitutional/EENT/Resp/CV/GI//MS/Neuro/Skin/Heme-Lymph-Imm.   Pursuant to the emergency declaration under the [facility], [facility] waiver authority and the [employer] Act, this Virtual Visit was conducted with patient's (and/or legal guardian's) consent, to reduce the risk of exposure to COVID-19 and provide necessary medical care. Services were provided through a video synchronous discussion virtually to substitute for in-person encounter. --Suzan Reed MD on 7/20/2020 at 12:41 PM    An electronic signature was used to authenticate this note.

## 2020-07-21 LAB — SARS-COV-2, COV2NT: DETECTED

## 2020-07-22 NOTE — PROGRESS NOTES
Called patient and informed her of positive COVID result. Patient understands quarantine and precautions.     Lori HERNANDEZ, ALEXANDERP-C

## 2020-07-23 PROBLEM — Z91.09 ENVIRONMENTAL ALLERGIES: Status: ACTIVE | Noted: 2018-08-06

## 2020-07-23 PROBLEM — Z78.0 MENOPAUSE PRESENT: Status: ACTIVE | Noted: 2018-08-06

## 2020-07-23 PROBLEM — L20.84 INTRINSIC ECZEMA: Status: ACTIVE | Noted: 2018-08-06

## 2020-07-23 PROBLEM — D21.9 FIBROIDS: Status: ACTIVE | Noted: 2018-08-06

## 2020-07-28 NOTE — PROGRESS NOTES
[facility]    CC: [id number] Infection and follow-up of Chronic Disease Management    HPI:     Jose L Potts, who was evaluated through a synchronous (real-time) audio-video encounter, and/or her healthcare decision maker, is aware that it is a billable service, with coverage as determined by her insurance carrier. She provided verbal consent to proceed: Yes, and patient identification was verified. It was conducted pursuant to the emergency declaration under the [address] [place] [address], [address] [place] [address] and the [employer] and [employer] Act. A caregiver was present when appropriate. Ability to conduct physical exam was limited. I was in the office. The patient was at home.       COVID-19 infection:  -Issue began on 7/12/2020 (8 days ago)  -Reports associated symptoms of headache, itchy throat, loss of smell/taste, slight cough, and stuffy nose  -Contracted the virus at the [employer] where she works, as they are currently dealing with a COVID-19 outbreak  -Was seen by ED for issue on 7/15/2020  -Currently she feels fine, but still has no smell or taste  -Taking Sudafed as needed, which helps for her congestion      Abdominal Cramps:  -Got requested lab work  -Taking Bentyl as prescribed  -Denies any side effects or issues with the medication  -Reports that the medication does help with issue        Iron Deficiency Anemia:  -Got requested lab work  -Taking iron supplement and norethindrone acetate (For menorrhagia)  -Denies any side effects or issues with the medications  -Continues to have menorrhagia  -Saw gynecologist last week  -CBC was checked and her HGB was in the 9s      ROS: Positive items marked in RED  CON: fever, chills  Cardiovascular: palpitations, CP  Resp: SOB, cough  GI: nausea, vomiting, diarrhea  : dysuria, hematuria      Past Medical History:   Diagnosis Date    Eczema     Iron deficiency anemia     Menorrhagia Past Surgical History:   Procedure Laterality Date    HX BREAST REDUCTION  2000    HX TUBAL LIGATION Bilateral 1992       Family History   Problem Relation Age of Onset    Hypertension Mother     Diabetes Mother        Social History     Tobacco Use    Smoking status: Never Smoker    Smokeless tobacco: Never Used   Substance Use Topics    Alcohol use: Yes     Comment: occassionally    Drug use: No       No Known Allergies      Current Outpatient Medications:     benzonatate (Tessalon Perles) 100 mg capsule, Take 1 Cap by mouth three (3) times daily as needed for Cough for up to 7 days. , Disp: 30 Cap, Rfl: 0    pseudoephedrine CR (Sudafed 12 Hour) 120 mg CR tablet, Take 1 Tab by mouth two (2) times daily as needed for Congestion. , Disp: 14 Tab, Rfl: 0    norethindrone acetate (AYGESTIN) 5 mg tablet, Take 5 mg by mouth daily. , Disp: , Rfl:     ferrous fumarate 324 mg (106 mg iron) tab, Take 1 Tab by mouth two (2) times a day., Disp: 60 Tab, Rfl: 3    ondansetron (ZOFRAN ODT) 4 mg disintegrating tablet, Take 1-2 Tabs by mouth every eight (8) hours as needed for Nausea or Vomiting., Disp: 15 Tab, Rfl: 0    acetaminophen (TYLENOL EXTRA STRENGTH) 500 mg tablet, Take 2 Tabs by mouth every six (6) hours as needed for Pain., Disp: 50 Tab, Rfl: 0    dicyclomine (BentyL) 10 mg capsule, Take 2 Caps by mouth every six (6) hours as needed for Abdominal Cramps., Disp: 30 Cap, Rfl: 1    Physical Exam:      General: Obese habitus, NAD, conversant  Eyes: sclera clear bilaterally, no discharge noted, eyelids normal in appearance, EOMI  HENT: NCAT  Neck: no masses visualized, trachea appears to be midline  Lungs: normal respiratory effort and rate, No visualized signs of difficulty breathing or respiratory distress  MS: normal AROM of neck noted  Skin: no significant exanthematous lesions or discoloration noted on neck/facial skin    Psych: alert and oriented to person, place and situation, normal affect  Neuro: speech normal, moving all upper extremities, no gaze palsy, no facial asymmetry (Cranial nerve 7 motor function) (limited exam due to video visit)    Results for Kevin Storey (MRN [id number]):   Ref. Range 5/12/2020 09:17 7/15/2020 16:10 7/15/2020 16:29   WBC Latest Ref Range: 3.8 - 10.8 Thousand/uL 3.7 (L)     RBC Latest Ref Range: 3.80 - 5.10 Million/uL 4.62     HGB Latest Ref Range: 11.7 - 15.5 g/dL 12.8     HCT Latest Ref Range: 35.0 - 45.0 % 39.1     MCV Latest Ref Range: 80.0 - 100.0 fL 84.6     MCH Latest Ref Range: 27.0 - 33.0 pg 27.7     MCHC Latest Ref Range: 32.0 - 36.0 g/dL 32.7     RDW Latest Ref Range: 11.0 - 15.0 % 16.5 (H)     PLATELET Latest Ref Range: 140 - 400 Thousand/uL 269     MEAN PLATELET VOLUME Latest Ref Range: 7.5 - 12.5 fL 10.2     H. pylori, Urea breath test Latest Ref Range: NOT DETECTED  NOT DETECTED     H. PYLORI BREATH TEST Unknown Rpt     NOVEL CORONAVIRUS (COVID-19) Unknown  Rpt (A)    XR CHEST PORT Unknown   Rpt       Assessment/Plan       COVID-19 infection, improving/resolving:  -Pastinet counseled on recommended symptomatic treatment for respiratory viral illness  -Patient to follow up as needed for issue  -Follow-up in 2 months      Abdominal Cramps, improving:  -Issue maybe due to IBS given improvement with Bentyl  -Will continue current Bentyl regimen  -Follow-up in 2 months        Iron Deficiency Anemia:  -2/2 menorrhagia  -Will defer further management to gynecology  -Need to obtain records from gynecologist office for review  -Follow-up in 2 months      April Caitlyn Cole is a 44 y.o. female being evaluated by a Virtual Visit (video visit) encounter to address concerns as mentioned above. A caregiver was present when appropriate. Due to this being a TeleHealth encounter (During [facility] public health emergency), evaluation of the following organ systems was limited: Vitals/Constitutional/EENT/Resp/CV/GI//MS/Neuro/Skin/Heme-Lymph-Imm.   Pursuant to the emergency declaration under the [facility] waiver authority and the [employer] and [employer] Act, this Virtual Visit was conducted with patient's (and/or legal guardian's) consent, to reduce the risk of exposure to COVID-19 and provide necessary medical care. Services were provided through a video synchronous discussion virtually to substitute for in-person encounter. --Rob Lundborg, MD on 7/20/2020 at 12:41 PM    An electronic signature was used to authenticate this note.

## 2020-08-14 ENCOUNTER — VIRTUAL VISIT (OUTPATIENT)
Dept: FAMILY MEDICINE CLINIC | Age: 50
End: 2020-08-14

## 2020-08-14 NOTE — PROGRESS NOTES
1. Have you been to the ER, urgent care clinic since your last visit? Hospitalized since your last visit? No    2. Have you seen or consulted any other health care providers outside of the [address] [place] [address] since your last visit? Include any pap smears or colon screening. No    This encounter was created in error - please disregard.

## 2020-08-14 NOTE — PROGRESS NOTES
This encounter was created in error - please disregard. Patient did not respond to texts to start visit.

## 2020-08-24 ENCOUNTER — VIRTUAL VISIT (OUTPATIENT)
Dept: FAMILY MEDICINE CLINIC | Age: 50
End: 2020-08-24

## 2020-08-26 ENCOUNTER — VIRTUAL VISIT (OUTPATIENT)
Dept: FAMILY MEDICINE CLINIC | Age: 50
End: 2020-08-26

## 2020-08-26 RX ORDER — OXYCODONE AND ACETAMINOPHEN 5; 325 MG/1; MG/1
TABLET ORAL
COMMUNITY
Start: 2020-08-20 | End: 2020-12-22

## 2020-08-26 RX ORDER — IBUPROFEN 800 MG/1
TABLET ORAL
COMMUNITY
Start: 2020-08-20 | End: 2020-10-22

## 2020-08-26 NOTE — PROGRESS NOTES
Chief Complaint   Patient presents with    Other     Discuss paperwork for [employer]        1. Have you been to the ER, urgent care clinic since your last visit? Hospitalized since your last visit? [place] 6/15/20    2. Have you seen or consulted any other health care providers outside of the [address] [place] [address] since your last visit? Include any pap smears or colon screening.  No

## 2020-08-31 ENCOUNTER — OFFICE VISIT (OUTPATIENT)
Dept: FAMILY MEDICINE CLINIC | Age: 50
End: 2020-08-31

## 2020-08-31 VITALS
HEART RATE: 79 BPM | OXYGEN SATURATION: 96 % | RESPIRATION RATE: 18 BRPM | DIASTOLIC BLOOD PRESSURE: 91 MMHG | WEIGHT: 193 LBS | BODY MASS INDEX: 37.89 KG/M2 | TEMPERATURE: 98.5 F | HEIGHT: 60 IN | SYSTOLIC BLOOD PRESSURE: 136 MMHG

## 2020-08-31 DIAGNOSIS — L30.9 ECZEMA, UNSPECIFIED TYPE: ICD-10-CM

## 2020-08-31 DIAGNOSIS — U07.1 COVID-19: ICD-10-CM

## 2020-08-31 DIAGNOSIS — L03.019 PARONYCHIA OF FINGER, UNSPECIFIED LATERALITY: Primary | ICD-10-CM

## 2020-08-31 DIAGNOSIS — Z23 ENCOUNTER FOR IMMUNIZATION: ICD-10-CM

## 2020-08-31 PROBLEM — E66.01 SEVERE OBESITY (HCC): Status: ACTIVE | Noted: 2020-08-31

## 2020-08-31 RX ORDER — MUPIROCIN CALCIUM 20 MG/G
CREAM TOPICAL 2 TIMES DAILY
Qty: 30 G | Refills: 1 | Status: SHIPPED | OUTPATIENT
Start: 2020-08-31 | End: 2020-12-22

## 2020-08-31 NOTE — PROGRESS NOTES
[facility]    CC: F/U of COVID-19    HPI:       COVID-19:  -Reports complete resolution of symptoms  -Was out of work since 7/15/34490-37/29/2020  -Was tested on 8/17/20220  -Was contacted on 8/19/2020 and told test confirmed resolution of infection  -Needs FMLA paperwork completed for her work      Finger Nail Infection:  -Issue started 3 weeks ago  -Issue is of the 2nd, 3rd, and 4th fingers of both hands  -Had started using Gel nails prior to developing issue and thinks it caused a fungal infection  -Saw her dermatologist for issue and was told to soak fingers in vinegar  -Issue is of the 2nd, 3rd, and 4th fingers of both hands  -Associated with redness, pain, and drainaiger  -Has been applying triple antibiotic ointment   -Has improved since it started  -Requested to be started on oral antibiotic      Eczema:  -Has been an issue her whole life  -Was seeing a dermatologist for issue, but would like to establish care with a new one  -Has been taking topical steroid for issue  -States she always has flares  -Currently has flare on medial of right ankle and on left elbow      ROS: Positive items marked in RED  CON: fever, chills  Cardiovascular: palpitations, CP  Resp: SOB, cough  GI: nausea, vomiting, diarrhea  : dysuria, hematuria      Past Medical History:   Diagnosis Date    Eczema     Iron deficiency anemia     Menorrhagia        Past Surgical History:   Procedure Laterality Date    HX BREAST REDUCTION  2000    HX HYSTERECTOMY  2020    HX TUBAL LIGATION Bilateral 1992    Hysterectomy - Total 8/20/20       Family History   Problem Relation Age of Onset    Hypertension Mother     Diabetes Mother        Social History     Tobacco Use    Smoking status: Never Smoker    Smokeless tobacco: Never Used   Substance Use Topics    Alcohol use: Yes     Comment: occassionally    Drug use: No       No Known Allergies      Current Outpatient Medications:     ibuprofen (MOTRIN) 800 mg tablet, , Disp: , Rfl:     oxyCODONE-acetaminophen (PERCOCET) 5-325 mg per tablet, , Disp: , Rfl:     dicyclomine (BentyL) 10 mg capsule, Take 2 Caps by mouth every six (6) hours as needed for Abdominal Cramps., Disp: 90 Cap, Rfl: 2    ondansetron (ZOFRAN ODT) 4 mg disintegrating tablet, Take 1-2 Tabs by mouth every eight (8) hours as needed for Nausea or Vomiting., Disp: 15 Tab, Rfl: 0    acetaminophen (TYLENOL EXTRA STRENGTH) 500 mg tablet, Take 2 Tabs by mouth every six (6) hours as needed for Pain., Disp: 50 Tab, Rfl: 0    pseudoephedrine CR (Sudafed 12 Hour) 120 mg CR tablet, Take 1 Tab by mouth two (2) times daily as needed for Congestion. , Disp: 14 Tab, Rfl: 0    norethindrone acetate (AYGESTIN) 5 mg tablet, Take 5 mg by mouth daily. , Disp: , Rfl:     ferrous fumarate 324 mg (106 mg iron) tab, Take 1 Tab by mouth two (2) times a day., Disp: 60 Tab, Rfl: 3    Physical Exam:      BP (!) 136/91   Pulse 79   Temp 98.5 °F (36.9 °C) (Oral)   Resp 18   Ht 5' (1.524 m)   Wt 193 lb (87.5 kg)   LMP 01/31/2020   SpO2 96%   BMI 37.69 kg/m²     General: obese habitus, NAD, conversant  Eyes: sclera clear bilaterally, no discharge noted, eyelids normal in appearance  HENT: NCAT, nasal turbinates, oropharynx clear, MMM  Neck: supple, no lymphadenopathy  Lungs: CTAB, normal respiratory effort and rate  CV: RRR, no MRGs  ABD: soft, non-tender, non-distended, normal bowel sounds  Ext: swelling and mild/minimal erythema at bases of nails on 2nd, 3rd, and 4th finger of both hands. no digital cyanosis noted  Skin: small patch of hyperpigmented skin noted on medial side of right ankle and on left elbow. normal temperature and turgor.   Psych: alert and oriented to person, place and situation, normal affect  Neuro: speech normal, moving all extremities, gait normal      SARS-CoV-2 PCR (COVID-19) (8/17/2020):  [facility]  Component Name Value Ref Range   SARS-CoV-2 PCR (COVID-19) Not Detected Not Detected Assessment/Plan     Paronychia, improving:  -Advised that oral antibiotic would not address issue if it was due to a fungal infection  -Started on Bactroban regimen  -Referred to new dermatology office, per patient's request  -Handout given on paronychia care  -F/U in 1 month      Eczema:  -Will continue current topical steroid regimen  -Referred to new dermatology office, per patient's request  -F/U in 1 month      COVID-19, resolved:  -FMLA paperwork completed during her visit  -Currently no indication for further intervention  -F/U in 1 month      Health Maintenance:  -Influenza vaccine administered during visit      El Gonzales MD  8/31/2020, 10:15 AM

## 2020-08-31 NOTE — PATIENT INSTRUCTIONS
Paronychia: Care Instructions Your Care Instructions Paronychia (say \"sznk-cs-FG-nathan-uh\") is an infection of the skin around a fingernail or toenail. It happens when germs enter through a break in the skin. The doctor may have made a small cut in the infected area to drain the pus. Most cases of paronychia improve in a few days. But watch your symptoms and follow your doctor's advice. Though rare, a mild case can turn into something more serious and infect your entire finger or toe. Also, it is possible for an infection to return. Follow-up care is a key part of your treatment and safety. Be sure to make and go to all appointments, and call your doctor if you are having problems. It's also a good idea to know your test results and keep a list of the medicines you take. How can you care for yourself at home? · If your doctor told you how to care for your infected nail, follow the doctor's instructions. If you did not get instructions, follow this general advice: 
? Wash the area with clean water 2 times a day. Don't use hydrogen peroxide or alcohol, which can slow healing. ? You may cover the area with a thin layer of petroleum jelly, such as Vaseline, and a nonstick bandage. ? Apply more petroleum jelly and replace the bandage as needed. · If your doctor prescribed antibiotics, take them as directed. Do not stop taking them just because you feel better. You need to take the full course of antibiotics. · Take an over-the-counter pain medicine, such as acetaminophen (Tylenol), ibuprofen (Advil, Motrin), or naproxen (Aleve). Read and follow all instructions on the label. · Do not take two or more pain medicines at the same time unless the doctor told you to. Many pain medicines have acetaminophen, which is Tylenol. Too much acetaminophen (Tylenol) can be harmful. · Prop up the toe or finger so that it is higher than the level of your heart. This will help with pain and swelling. · Apply heat. Put a warm water bottle, heating pad set on low, or warm cloth on your finger or toe. Do not go to sleep with a heating pad on your skin. · Soak the area in warm water twice a day for 15 minutes each time. After soaking, dry the area well and apply a thin layer of petroleum jelly, such as Vaseline. Put on a new bandage. When should you call for help? Call your doctor now or seek immediate medical care if: 
· You have signs of new or worsening infection, such as: 
? Increased pain, swelling, warmth, or redness. ? Red streaks leading from the infected skin. ? Pus draining from the area. ? A fever. Watch closely for changes in your health, and be sure to contact your doctor if: 
· You do not get better as expected. Where can you learn more? Go to http://www.schwartz.com/ Enter C435 in the search box to learn more about \"Paronychia: Care Instructions. \" Current as of: October 31, 2019               Content Version: 12.5 © 9183-5572 [employer]. Care instructions adapted under license by [employer] (which disclaims liability or warranty for this information). If you have questions about a medical condition or this instruction, always ask your healthcare professional. [address] any warranty or liability for your use of this information.

## 2020-08-31 NOTE — PROGRESS NOTES
1. Have you been to the ER, urgent care clinic since your last visit? Hospitalized since your last visit? No    2. Have you seen or consulted any other health care providers outside of the [address] [place] [address] since your last visit? Include any pap smears or colon screening. No    After obtaining consent, and per orders of Dr. Hillary Sahu , injection of Influenza given by Lauri Sanchez LPN. Patient observed. No signs nor symptoms of any adverse reactions. Patient tolerated injections well.

## 2020-10-22 ENCOUNTER — OFFICE VISIT (OUTPATIENT)
Dept: FAMILY MEDICINE CLINIC | Age: 50
End: 2020-10-22
Payer: COMMERCIAL

## 2020-10-22 VITALS
WEIGHT: 194 LBS | DIASTOLIC BLOOD PRESSURE: 91 MMHG | HEIGHT: 60 IN | RESPIRATION RATE: 16 BRPM | HEART RATE: 58 BPM | OXYGEN SATURATION: 98 % | TEMPERATURE: 98 F | SYSTOLIC BLOOD PRESSURE: 171 MMHG | BODY MASS INDEX: 38.09 KG/M2

## 2020-10-22 DIAGNOSIS — M79.645 PAIN IN FINGER OF BOTH HANDS: ICD-10-CM

## 2020-10-22 DIAGNOSIS — M79.644 PAIN IN FINGER OF BOTH HANDS: ICD-10-CM

## 2020-10-22 DIAGNOSIS — L30.9 ECZEMA, UNSPECIFIED TYPE: Primary | ICD-10-CM

## 2020-10-22 DIAGNOSIS — E66.9 CLASS 2 OBESITY WITHOUT SERIOUS COMORBIDITY WITH BODY MASS INDEX (BMI) OF 37.0 TO 37.9 IN ADULT, UNSPECIFIED OBESITY TYPE: ICD-10-CM

## 2020-10-22 PROCEDURE — 99213 OFFICE O/P EST LOW 20 MIN: CPT | Performed by: FAMILY MEDICINE

## 2020-10-22 RX ORDER — DICLOFENAC SODIUM 75 MG/1
75 TABLET, DELAYED RELEASE ORAL 2 TIMES DAILY
Qty: 60 TAB | Refills: 1 | Status: SHIPPED | OUTPATIENT
Start: 2020-10-22 | End: 2020-12-22

## 2020-10-22 RX ORDER — PREDNISONE 20 MG/1
20 TABLET ORAL
COMMUNITY
Start: 2020-10-21

## 2020-10-22 NOTE — PROGRESS NOTES
1. Have you been to the ER, urgent care clinic since your last visit? Hospitalized since your last visit? No    2. Have you seen or consulted any other health care providers outside of the [address] [place] [address] since your last visit? Include any pap smears or colon screening.  No

## 2020-10-22 NOTE — PROGRESS NOTES
[facility]    CC: F/U of Paronychia    HPI:     Paronychia:  -Saw dermatology for issue  -Was told issue was due to her eczema  -Started on topical steroid regimen  -States she has been taking the topical steroid regimen  -Reports initially she had improvement in issue with use of regimen  -Issue got worse when she went to work due to use of gloves  -Saw dermatologist yesterday  -Was started on prednisone regimen  -Will follow-up in 4 to 6 weeks      Obesity:  -On no weight loss medication  -Reports she has lost 6 pounds  -Exercising daily for 20 minutes  -Reports she is using an air fryer and eating more vegetables      ROS: Positive items marked in RED  CON: fever, chills  Cardiovascular: palpitations, CP  Resp: SOB, cough  GI: nausea, vomiting, diarrhea  : dysuria, hematuria      Past Medical History:   Diagnosis Date    Eczema     Iron deficiency anemia     Menorrhagia        Past Surgical History:   Procedure Laterality Date    HX BREAST REDUCTION  2000    HX HYSTERECTOMY  2020    HX TUBAL LIGATION Bilateral 1992    Hysterectomy - Total 8/20/20       Family History   Problem Relation Age of Onset    Hypertension Mother     Diabetes Mother        Social History     Tobacco Use    Smoking status: Never Smoker    Smokeless tobacco: Never Used   Substance Use Topics    Alcohol use: Yes     Comment: occassionally    Drug use: No       No Known Allergies      Current Outpatient Medications:     predniSONE (DELTASONE) 20 mg tablet, Take 20 mg by mouth.  taper, Disp: , Rfl:     dicyclomine (BentyL) 10 mg capsule, Take 2 Caps by mouth every six (6) hours as needed for Abdominal Cramps., Disp: 90 Cap, Rfl: 2    acetaminophen (TYLENOL EXTRA STRENGTH) 500 mg tablet, Take 2 Tabs by mouth every six (6) hours as needed for Pain., Disp: 50 Tab, Rfl: 0    mupirocin calcium (BACTROBAN) 2 % topical cream, Apply  to affected area two (2) times a day., Disp: 30 g, Rfl: 1    ibuprofen (MOTRIN) 800 mg tablet, , Disp: , Rfl:     oxyCODONE-acetaminophen (PERCOCET) 5-325 mg per tablet, , Disp: , Rfl:     pseudoephedrine CR (Sudafed 12 Hour) 120 mg CR tablet, Take 1 Tab by mouth two (2) times daily as needed for Congestion. , Disp: 14 Tab, Rfl: 0    norethindrone acetate (AYGESTIN) 5 mg tablet, Take 5 mg by mouth daily. , Disp: , Rfl:     ferrous fumarate 324 mg (106 mg iron) tab, Take 1 Tab by mouth two (2) times a day., Disp: 60 Tab, Rfl: 3    ondansetron (ZOFRAN ODT) 4 mg disintegrating tablet, Take 1-2 Tabs by mouth every eight (8) hours as needed for Nausea or Vomiting., Disp: 15 Tab, Rfl: 0    Physical Exam:      BP (!) 171/91   Pulse (!) 58   Temp 98 °F (36.7 °C) (Oral)   Resp 16   Ht 5' (1.524 m)   Wt 194 lb (88 kg)   LMP 01/31/2020   SpO2 98%   BMI 37.89 kg/m²     General: Obese habitus, NAD, conversant  Eyes: sclera clear bilaterally, no discharge noted, eyelids normal in appearance  HENT: NCAT  Lungs: CTAB, normal respiratory effort and rate  CV: RRR, no MRGs  ABD: soft, non-tender, non-distended, normal bowel sounds  Ext: no peripheral edema or digital cyanosis noted  Skin: Notable peeling of skin around fingernails  Psych: alert and oriented to person, place and situation, normal affect  Neuro: speech normal, moving all extremities, gait normal      Assessment/Plan     Eczema:  -Will defer management to dermatologist  -Started on diclofenac regimen for associated pain/discomfort in fingers  -Need to obtain records from dermatologist for review  -Follow-up in 2 months      Obesity, unchanged:  -Encouraged to work on recommend lifestyle changes  -Referred to nutritionist  -Follow-up in 2 months        Carli Craven MD  10/22/2020, 1:10 PM

## 2020-12-22 ENCOUNTER — OFFICE VISIT (OUTPATIENT)
Dept: FAMILY MEDICINE CLINIC | Age: 50
End: 2020-12-22
Payer: COMMERCIAL

## 2020-12-22 VITALS
RESPIRATION RATE: 16 BRPM | BODY MASS INDEX: 39.46 KG/M2 | SYSTOLIC BLOOD PRESSURE: 145 MMHG | HEART RATE: 87 BPM | HEIGHT: 60 IN | WEIGHT: 201 LBS | OXYGEN SATURATION: 97 % | TEMPERATURE: 97.8 F | DIASTOLIC BLOOD PRESSURE: 78 MMHG

## 2020-12-22 DIAGNOSIS — L30.0 NUMMULAR DERMATITIS: Primary | ICD-10-CM

## 2020-12-22 DIAGNOSIS — E66.9 CLASS 2 OBESITY WITH BODY MASS INDEX (BMI) OF 37.0 TO 37.9 IN ADULT, UNSPECIFIED OBESITY TYPE, UNSPECIFIED WHETHER SERIOUS COMORBIDITY PRESENT: ICD-10-CM

## 2020-12-22 DIAGNOSIS — T38.0X5A ADVERSE EFFECT OF CORTICOSTEROIDS, INITIAL ENCOUNTER: ICD-10-CM

## 2020-12-22 PROCEDURE — 99213 OFFICE O/P EST LOW 20 MIN: CPT | Performed by: FAMILY MEDICINE

## 2020-12-22 NOTE — PATIENT INSTRUCTIONS
Learning About Cutting Calories How do calories affect your weight? Food gives your body energy. Energy from the food you eat is measured in calories. This energy keeps your heart beating, your brain active, and your muscles working. Your body needs a certain number of calories each day. After your body uses the calories it needs, it stores extra calories as fat. To lose weight safely, you have to eat fewer calories while eating in a healthy way. How many calories do you need each day? The more active you are, the more calories you need. When you are less active, you need fewer calories. How many calories you need each day also depends on several things, including your age and whether you are male or female. Here are some general guidelines for adults: 
· Less active women and older adults need 1,600 to 2,000 calories each day. · Active women and less active men need 2,000 to 2,400 calories each day. · Active men need 2,400 to 3,000 calories each day. How can you cut calories and eat healthy meals? Whole grains, vegetables and fruits, and dried beans are good lower-calorie foods. They give you lots of nutrients and fiber. And they fill you up. Sweets, energy drinks, and soda pop are high in calories. They give you few nutrients and no fiber. Try to limit soda pop, fruit juice, and energy drinks. Drink water instead. Some fats can be part of a healthy diet. But cutting back on fats from highly processed foods like fast foods and many snack foods is a good way to lower the calories in your diet. Also, use smaller amounts of fats like butter, margarine, salad dressing, and mayonnaise. Add fresh garlic, lemon, or herbs to your meals to add flavor without adding fat. Meats and dairy products can be a big source of hidden fats. Try to choose lean or low-fat versions of these products. Fat-free cookies, candies, chips, and frozen treats can still be high in sugar and calories. Some fat-free foods have more calories than regular ones. Eat fat-free treats in moderation, as you would other foods. 
If your favorite foods are high in fat, salt, sugar, or calories, limit how often you eat them. Eat smaller servings, or look for healthy substitutes. Fill up on fruits, vegetables, and whole grains. 
Eating at home 
· Use meat as a side dish instead of as the main part of your meal. 
· Try main dishes that use whole wheat pasta, brown rice, dried beans, or vegetables. 
· Find ways to cook with little or no fat, such as broiling, steaming, or grilling. 
· Use cooking spray instead of oil. If you use oil, use a monounsaturated oil, such as canola or olive oil. 
· Trim fat from meats before you cook them. 
· Drain off fat after you brown the meat or while you roast it. 
· Chill soups and stews after you cook them. Then skim the fat off the top after it hardens. 
Eating out 
· Order foods that are broiled or poached rather than fried or breaded. 
· Cut back on the amount of butter or margarine that you use on bread. 
· Order sauces, gravies, and salad dressings on the side, and use only a little. 
· When you order pasta, choose tomato sauce rather than cream sauce. 
· Ask for salsa with your baked potato instead of sour cream, butter, cheese, or gauthier. 
· Order meals in a small size instead of upgrading to a large. 
· Share an entree, or take part of your food home to eat as another meal. 
· Share appetizers and desserts. 
Where can you learn more? 
Go to https://www.[employer].net/GoodHelpConnections 
Enter V914 in the search box to learn more about \"Learning About Cutting Calories.\" 
Current as of: August 22, 2019               Content Version: 12.6 
© 3648-6720 [employer].  
 Care instructions adapted under license by [email or web address] (which disclaims liability or warranty for this information). If you have questions about a medical condition or this instruction, always ask your healthcare professional. [address] any warranty or liability for your use of this information. Learning About Low-Carbohydrate Foods What foods are low in carbohydrate? The foods you eat contain nutrients, such as vitamins and minerals. Carbohydrate is a nutrient. Your body needs the right amount to stay healthy and work as it should. You can use the list below to help you make choices about which foods to eat. Some foods that are lower in carbohydrate include: 
Dairy and dairy alternatives · Cheese · [address] · Cream cheese · Nut milk (unsweetened) · Soy milk (unsweetened) · Yogurt (Greek, plain) Fruits · Avocado · [employer] and other protein foods · Almonds · Beef · Chicken · [address] · Eggs · Halibut · Peanut butter and other nut butters · Pistachios · Pork · Pumpkin seeds · Tofu · Trout · [facility] [place] · [place] · Walnuts Vegetables · Broccoli · Carrots · Cauliflower · Green beans · Mushrooms · Peppers · Salad greens · Spinach · Tomatoes Work with your doctor to find out how much of this nutrient you need. Depending on your health, you may need more or less of it in your diet. Where can you learn more? Go to http://www.gray.com/ Enter 74 [id number] 545 in the search box to learn more about \"Learning About Low-Carbohydrate Foods. \" Current as of: August 22, 2019               Content Version: 12.6 © 4879-8926 [employer]. Care instructions adapted under license by [address] (which disclaims liability or warranty for this information). If you have questions about a medical condition or this instruction, always ask your healthcare professional. [address] any warranty or liability for your use of this information. Learning About Low-Carbohydrate Diets What is a low-carbohydrate diet? A low-carbohydrate (or \"low-carb\") diet limits foods and drinks that have carbohydrates. This includes grains, fruits, milk and yogurt, and starchy vegetables like potatoes, beans, and corn. It also avoids foods and drinks that have added sugar. Instead, low-carb diets include foods that are high in protein and fat. Why might you follow a low-carb diet? Low-carb diets may be used for a variety of reasons, such as for weight loss. People who have diabetes may use a low-carb diet to help manage their blood sugar levels. What should you do before you start the diet? Talk to your doctor before you try any diet. This is even more important if you have health problems like kidney disease, heart disease, or diabetes. Your doctor may suggest that you meet with a registered dietitian. A dietitian can help you make an eating plan that works for you. What foods do you eat on a low-carb diet? On a low-carb diet, you choose foods that are high in protein and fat. Examples of these are: · Meat, poultry, and fish. · Eggs. · Nuts, such as walnuts, pecans, almonds, and peanuts. · Peanut butter and other nut butters. · Tofu. · Avocado. · [place]. · Non-starchy vegetables like broccoli, cauliflower, green beans, mushrooms, peppers, lettuce, and spinach. · Unsweetened non-dairy milks like almond milk and coconut milk. · Cheese, cottage cheese, and cream cheese. Current as of: August 22, 2019               Content Version: 12.6 © 4145-6039 [employer]. Care instructions adapted under license by [employer] (which disclaims liability or warranty for this information). If you have questions about a medical condition or this instruction, always ask your healthcare professional. [address] any warranty or liability for your use of this information. Starting a Weight Loss Plan: Care Instructions Your Care Instructions If you are thinking about losing weight, it can be hard to know where to start. Your doctor can help you set up a weight loss plan that best meets your needs. You may want to take a class on nutrition or exercise, or join a weight loss support group. If you have questions about how to make changes to your eating or exercise habits, ask your doctor about seeing a registered dietitian or an exercise specialist. 
It can be a big challenge to lose weight. But you do not have to make huge changes at once. Make small changes, and stick with them. When those changes become habit, add a few more changes. If you do not think you are ready to make changes right now, try to pick a date in the future. Make an appointment to see your doctor to discuss whether the time is right for you to start a plan. Follow-up care is a key part of your treatment and safety. Be sure to make and go to all appointments, and call your doctor if you are having problems. It's also a good idea to know your test results and keep a list of the medicines you take. How can you care for yourself at home? · Set realistic goals. Many people expect to lose much more weight than is likely. A weight loss of 5% to 10% of your body weight may be enough to improve your health. · Get family and friends involved to provide support. Talk to them about why you are trying to lose weight, and ask them to help. They can help by participating in exercise and having meals with you, even if they may be eating something different. · Find what works best for you. If you do not have time or do not like to cook, a program that offers meal replacement bars or shakes may be better for you. Or if you like to prepare meals, finding a plan that includes daily menus and recipes may be best. 
· Ask your doctor about other health professionals who can help you achieve your weight loss goals. ? A dietitian can help you make healthy changes in your diet. ? An exercise specialist or  can help you develop a safe and effective exercise program. 
? A counselor or psychiatrist can help you cope with issues such as depression, anxiety, or family problems that can make it hard to focus on weight loss. · Consider joining a support group for people who are trying to lose weight. Your doctor can suggest groups in your area. Where can you learn more? Go to http://www.gray.com/ Enter V400 in the search box to learn more about \"Starting a Weight Loss Plan: Care Instructions. \" Current as of: December 11, 2019               Content Version: 12.6 © 1603-6918 "[employer] Incorporated. Care instructions adapted under license by [employer] (which disclaims liability or warranty for this information). If you have questions about a medical condition or this instruction, always ask your healthcare professional. [address] any warranty or liability for your use of this information.

## 2020-12-22 NOTE — PROGRESS NOTES
[facility]    CC: F/U of Dermatitis    HPI:       Dermatitis:  -Continues seeing dermatology for issue  -Last visit was 3 weeks ago  -Was started on a longer course of prednisone   -Reports that prednisone does cause some issues with headache, nausea/vomiting, and palpitations  -Has had significant improvement in issue  -Dupixent regimen was ordered by dermatologist  -She is currently waiting for authorization from insurance company  -Next visit with dermatologist is in January      Obesity:  -No weight loss medication  -Has been working to lose weight  -Has stopped eating meat and is eating more fruits and vegetables  -Walks 3 days a week for 30 minutes      ROS: Positive items marked in RED  CON: fever, chills  Cardiovascular: palpitations, CP  Resp: SOB, cough  GI: nausea, vomiting, diarrhea  : dysuria, hematuria      Past Medical History:   Diagnosis Date    Eczema     Iron deficiency anemia     Menorrhagia        Past Surgical History:   Procedure Laterality Date    HX BREAST REDUCTION  2000    HX HYSTERECTOMY  2020    HX TUBAL LIGATION Bilateral 1992    Hysterectomy - Total 8/20/20       Family History   Problem Relation Age of Onset    Hypertension Mother     Diabetes Mother        Social History     Tobacco Use    Smoking status: Never Smoker    Smokeless tobacco: Never Used   Substance Use Topics    Alcohol use: Yes     Comment: occassionally    Drug use: No       No Known Allergies      Current Outpatient Medications:     predniSONE (DELTASONE) 20 mg tablet, Take 20 mg by mouth.  taper, Disp: , Rfl:     acetaminophen (TYLENOL EXTRA STRENGTH) 500 mg tablet, Take 2 Tabs by mouth every six (6) hours as needed for Pain., Disp: 50 Tab, Rfl: 0    diclofenac EC (VOLTAREN) 75 mg EC tablet, Take 1 Tab by mouth two (2) times a day., Disp: 60 Tab, Rfl: 1    mupirocin calcium (BACTROBAN) 2 % topical cream, Apply  to affected area two (2) times a day., Disp: 30 g, Rfl: 1   oxyCODONE-acetaminophen (PERCOCET) 5-325 mg per tablet, , Disp: , Rfl:     dicyclomine (BentyL) 10 mg capsule, Take 2 Caps by mouth every six (6) hours as needed for Abdominal Cramps., Disp: 90 Cap, Rfl: 2    pseudoephedrine CR (Sudafed 12 Hour) 120 mg CR tablet, Take 1 Tab by mouth two (2) times daily as needed for Congestion. , Disp: 14 Tab, Rfl: 0    norethindrone acetate (AYGESTIN) 5 mg tablet, Take 5 mg by mouth daily. , Disp: , Rfl:     ferrous fumarate 324 mg (106 mg iron) tab, Take 1 Tab by mouth two (2) times a day., Disp: 60 Tab, Rfl: 3    ondansetron (ZOFRAN ODT) 4 mg disintegrating tablet, Take 1-2 Tabs by mouth every eight (8) hours as needed for Nausea or Vomiting., Disp: 15 Tab, Rfl: 0    Physical Exam:      BP (!) 145/78   Pulse 87   Temp 97.8 °F (36.6 °C) (Temporal)   Resp 16   Ht 5' (1.524 m)   Wt 201 lb (91.2 kg)   LMP 01/31/2020   SpO2 97%   BMI 39.26 kg/m²     General: obese habitus, NAD, conversant  Eyes: sclera clear bilaterally, no discharge noted, eyelids normal in appearance  HENT: NCAT  Lungs: CTAB, normal respiratory effort and rate  CV: RRR, no MRGs  ABD: soft, non-tender, non-distended, normal bowel sounds  Ext: no peripheral edema or digital cyanosis noted  Skin: significant improvement of peeling of skin around fingernails, normal temperature, turgor, color, and texture  Psych: alert and oriented to person, place and situation, normal affect  Neuro: speech normal, moving all extremities, gait normal      Assessment/Plan       Nummular Dermatitis, improving:  -Will defer management to dermatologist  -Need to obtain records from dermatologist for review  -Follow-up 3 months      Obesity, worsening  -Has gained 7 pounds since last visit  -Advised that steroid was likely contributing to her weight gain  -Encouraged to work on recommend lifestyle changes  -Follow-up in 3 months      Adverse Effect of Corticosteroid:  -Likely to be primary cause of her recent weight gain and elevated BP  -Suspect issue will improve with completion of steroid regimen  -BP trend needs to be monitor  -Follow-up in 3 months        Mukesh Bravo MD  12/22/2020, 2:57 PM

## 2020-12-22 NOTE — PROGRESS NOTES
1. Have you been to the ER, urgent care clinic since your last visit? Hospitalized since your last visit? No    2. Have you seen or consulted any other health care providers outside of the [address] [place] [address] since your last visit? Include any pap smears or colon screening.  Yes Dermatologist

## 2021-01-14 ENCOUNTER — PATIENT MESSAGE (OUTPATIENT)
Dept: FAMILY MEDICINE CLINIC | Age: 51
End: 2021-01-14

## 2021-01-14 NOTE — TELEPHONE ENCOUNTER
Dear Snow Christianson      We are following up on the letter that  was sent to you  in December concerning Mammograms . We greatly appreciate  you helping us in obtaining this information. Enclosed was a Medical Release form. Please complete the top section , and include the facility where you had your mammogram performed. You can sign the form and upload document back to me. Thanks for your attention in this matter .           [facility] [address] [place] [address]